# Patient Record
Sex: FEMALE | Race: WHITE | HISPANIC OR LATINO | Employment: UNEMPLOYED | URBAN - METROPOLITAN AREA
[De-identification: names, ages, dates, MRNs, and addresses within clinical notes are randomized per-mention and may not be internally consistent; named-entity substitution may affect disease eponyms.]

---

## 2017-01-24 ENCOUNTER — TELEPHONE (OUTPATIENT)
Dept: OBSTETRICS AND GYNECOLOGY | Age: 57
End: 2017-01-24

## 2017-01-24 NOTE — TELEPHONE ENCOUNTER
Pt called stating she is still having some issue after her hysterectomy in Aug '16. Pt states there are some problems w/ bladder and rectal prolapse as well. Pt states the pain is very minimal when it occurs, it is more discomfort.    Pt # [phone]

## 2017-02-07 ENCOUNTER — OFFICE VISIT (OUTPATIENT)
Dept: OBSTETRICS AND GYNECOLOGY | Age: 57
End: 2017-02-07

## 2017-02-07 VITALS
HEIGHT: 64 IN | DIASTOLIC BLOOD PRESSURE: 62 MMHG | WEIGHT: 180.4 LBS | BODY MASS INDEX: 30.8 KG/M2 | SYSTOLIC BLOOD PRESSURE: 124 MMHG

## 2017-02-07 DIAGNOSIS — N81.10 BLADDER PROLAPSE, FEMALE, ACQUIRED: Primary | ICD-10-CM

## 2017-02-07 PROCEDURE — 99213 OFFICE O/P EST LOW 20 MIN: CPT | Performed by: OBSTETRICS & GYNECOLOGY

## 2017-02-07 NOTE — PROGRESS NOTES
Wen Correa is a 56 y.o. female is being seen today for bladder prolapse since hyst. Worsening  Chief Complaint   Patient presents with   • Bladder Prolapse   .    History of Present Illness  Patient is here for checkup and she is only Georgina 56 months after from a hysterectomy and P repair she is complaining of vaginal pressure when she walks goes upstairs really most of the time.  She has no incontinence no frequency no urgency good bowel movements.  The following portions of the patient's history were reviewed and updated as appropriate: allergies, current medications, past family history, past medical history, past social history, past surgical history and problem list.    PAST MEDICAL HISTORY  Past Medical History   Diagnosis Date   • Prolapse of uterus      OB History     No data available        Past Surgical History   Procedure Laterality Date   • No past surgeries     • Laparoscopic assisted vaginal hysterectomy N/A 8/24/2016     Procedure: LAVH AND BSO;  Surgeon: Dilip Bond MD;  [place] [facility] [place] [facility] [place]:    • Anterior and posterior vaginal repair N/A 8/24/2016     Procedure: ANTERIOR AND POSTERIOR  REPAIR;  Surgeon: Dilip Bond MD;  [place] [facility] [place] [facility] [place]  Service:      History reviewed. No pertinent family history.  History   Smoking Status   • Never Smoker   Smokeless Tobacco   • Never Used       Current Outpatient Prescriptions:   •  fluticasone (CUTIVATE) 0.05 % cream, Apply  topically 2 (Two) Times a Day., Disp: 15 g, Rfl: 1  •  oxyCODONE-acetaminophen (PERCOCET) 7.5-325 MG per tablet, TAKE 1 TO 2 TABLETS EVERY 4 HOURS AS NEEDED FOR PAIN, Disp: , Rfl: 0    There is no immunization history on file for this patient.    Review of Systems    Objective   Physical Exam  On exam she had a to 3+ cystocele which is recurrent after surgery.  Small rectocele and a little bit of an enterocele vaginal prolapse.  This is all discussed with the patient today.    Assessment/Plan    Cary was seen today for bladder prolapse.    Diagnoses and all orders for this visit:    Bladder prolapse, female, acquired      I talked about the options for this which be to do nothing with a longer versus pessary versus surgery.  At her age being sexually active I would somewhat encourage surgery but I would refer her out for at least a second opinion if not a uro-gynecologist.  Patient will consider this but  I will put in a consult

## 2017-03-20 DIAGNOSIS — Z12.31 ENCOUNTER FOR SCREENING MAMMOGRAM FOR BREAST CANCER: Primary | ICD-10-CM

## 2017-03-21 ENCOUNTER — OFFICE VISIT (OUTPATIENT)
Dept: INTERNAL MEDICINE | Facility: CLINIC | Age: 57
End: 2017-03-21

## 2017-03-21 ENCOUNTER — APPOINTMENT (OUTPATIENT)
Dept: WOMENS IMAGING | Facility: HOSPITAL | Age: 57
End: 2017-03-21

## 2017-03-21 DIAGNOSIS — Z12.31 ENCOUNTER FOR SCREENING MAMMOGRAM FOR BREAST CANCER: ICD-10-CM

## 2017-03-21 PROCEDURE — 77067 SCR MAMMO BI INCL CAD: CPT | Performed by: INTERNAL MEDICINE

## 2017-03-21 PROCEDURE — 77067 SCR MAMMO BI INCL CAD: CPT | Performed by: RADIOLOGY

## 2017-04-17 ENCOUNTER — OFFICE VISIT (OUTPATIENT)
Dept: INTERNAL MEDICINE | Facility: CLINIC | Age: 57
End: 2017-04-17

## 2017-04-17 VITALS
SYSTOLIC BLOOD PRESSURE: 128 MMHG | BODY MASS INDEX: 31.07 KG/M2 | DIASTOLIC BLOOD PRESSURE: 78 MMHG | WEIGHT: 182 LBS | HEIGHT: 64 IN

## 2017-04-17 DIAGNOSIS — N64.4 PAIN OF BOTH BREASTS: Primary | ICD-10-CM

## 2017-04-17 PROCEDURE — 99213 OFFICE O/P EST LOW 20 MIN: CPT | Performed by: INTERNAL MEDICINE

## 2017-04-17 NOTE — PROGRESS NOTES
"Chief Complaint   Patient presents with   • Breast Pain        Subjective   Cary Correa is a 57 y.o. female     Breast Pain   This is a new problem. The current episode started more than 1 month ago. The problem occurs intermittently. The problem has been unchanged. Pertinent negatives include no anorexia, chest pain, coughing, diaphoresis, fatigue or fever.   : Breasts feel tender, left bothers her a little more than the right. No nipple discharge.  No lumps that she has noticed. No skin changes. She has recently had a mammogram.  No abnormality was seen.    The following portions of the patient's history were reviewed and updated as appropriate: allergies, current medications, past social history, problem list, past surgical history    Review of Systems   Constitutional: Negative for appetite change, diaphoresis, fatigue, fever and unexpected weight change.   Respiratory: Negative for cough and shortness of breath.    Cardiovascular: Negative for chest pain, palpitations and leg swelling.   Gastrointestinal: Negative for anorexia.       /78 (BP Location: Left arm, Patient Position: Sitting, Cuff Size: Adult)  Ht 64\" (162.6 cm)  Wt 182 lb (82.6 kg)  LMP  (LMP Unknown)  BMI 31.24 kg/m2     Objective   Physical Exam   Constitutional: She appears well-developed and well-nourished. No distress.   Cardiovascular: Regular rhythm, S1 normal and S2 normal.  Exam reveals no gallop and no friction rub.    No murmur heard.  Pulmonary/Chest: Effort normal and breath sounds normal. She has no wheezes. She has no rhonchi. She has no rales. Right breast exhibits no inverted nipple, no mass, no nipple discharge, no skin change and no tenderness. Left breast exhibits tenderness. Left breast exhibits no inverted nipple, no nipple discharge and no skin change.   Tenderness to palpation present left breast, upper outer quadrant.  There is some irregularity of the breast tissue in that area.   Lymphadenopathy:     She has " no cervical adenopathy.     She has no axillary adenopathy.   Nursing note and vitals reviewed.      Assessment/Plan   Problem List Items Addressed This Visit     None      Visit Diagnoses     Pain of both breasts    -  Primary      probable fibrocystic changes palpated upper outer quadrant left breast.  Discussed further evaluation with ultrasound.  She has a lot of medical bills right now and would like to hold off on that for now. Will re-evaluate in 3 months.

## 2017-05-08 ENCOUNTER — TELEPHONE (OUTPATIENT)
Dept: OBSTETRICS AND GYNECOLOGY | Age: 57
End: 2017-05-08

## 2017-05-08 DIAGNOSIS — N81.10 VAGINAL PROLAPSE WITHOUT UTERINE PROLAPSE: Primary | ICD-10-CM

## 2017-05-09 ENCOUNTER — TELEPHONE (OUTPATIENT)
Dept: OBSTETRICS AND GYNECOLOGY | Age: 57
End: 2017-05-09

## 2017-05-10 ENCOUNTER — TELEPHONE (OUTPATIENT)
Dept: OBSTETRICS AND GYNECOLOGY | Age: 57
End: 2017-05-10

## 2017-07-27 ENCOUNTER — APPOINTMENT (OUTPATIENT)
Dept: PREADMISSION TESTING | Facility: HOSPITAL | Age: 57
End: 2017-07-27

## 2017-07-27 VITALS
DIASTOLIC BLOOD PRESSURE: 85 MMHG | HEIGHT: 64 IN | OXYGEN SATURATION: 97 % | WEIGHT: 183 LBS | TEMPERATURE: 98.5 F | HEART RATE: 58 BPM | SYSTOLIC BLOOD PRESSURE: 133 MMHG | BODY MASS INDEX: 31.24 KG/M2 | RESPIRATION RATE: 16 BRPM

## 2017-07-27 LAB
ABO GROUP BLD: NORMAL
ALBUMIN SERPL-MCNC: 4.4 G/DL (ref 3.5–5.2)
ALBUMIN/GLOB SERPL: 1.3 G/DL
ALP SERPL-CCNC: 81 U/L (ref 39–117)
ALT SERPL W P-5'-P-CCNC: 23 U/L (ref 1–33)
ANION GAP SERPL CALCULATED.3IONS-SCNC: 11.4 MMOL/L
AST SERPL-CCNC: 21 U/L (ref 1–32)
BACTERIA UR QL AUTO: ABNORMAL /HPF
BILIRUB SERPL-MCNC: 0.4 MG/DL (ref 0.1–1.2)
BILIRUB UR QL STRIP: NEGATIVE
BLD GP AB SCN SERPL QL: POSITIVE
BUN BLD-MCNC: 14 MG/DL (ref 6–20)
BUN/CREAT SERPL: 15.2 (ref 7–25)
CALCIUM SPEC-SCNC: 9.7 MG/DL (ref 8.6–10.5)
CHLORIDE SERPL-SCNC: 103 MMOL/L (ref 98–107)
CLARITY UR: CLEAR
CO2 SERPL-SCNC: 26.6 MMOL/L (ref 22–29)
COLOR UR: YELLOW
CREAT BLD-MCNC: 0.92 MG/DL (ref 0.57–1)
DEPRECATED RDW RBC AUTO: 49.3 FL (ref 37–54)
ERYTHROCYTE [DISTWIDTH] IN BLOOD BY AUTOMATED COUNT: 14.5 % (ref 11.7–13)
GFR SERPL CREATININE-BSD FRML MDRD: 63 ML/MIN/1.73
GLOBULIN UR ELPH-MCNC: 3.4 GM/DL
GLUCOSE BLD-MCNC: 86 MG/DL (ref 65–99)
GLUCOSE UR STRIP-MCNC: NEGATIVE MG/DL
HCT VFR BLD AUTO: 43.7 % (ref 35.6–45.5)
HGB BLD-MCNC: 13.9 G/DL (ref 11.9–15.5)
HGB UR QL STRIP.AUTO: ABNORMAL
HYALINE CASTS UR QL AUTO: ABNORMAL /LPF
KETONES UR QL STRIP: NEGATIVE
LEUKOCYTE ESTERASE UR QL STRIP.AUTO: NEGATIVE
MCH RBC QN AUTO: 29.3 PG (ref 26.9–32)
MCHC RBC AUTO-ENTMCNC: 31.8 G/DL (ref 32.4–36.3)
MCV RBC AUTO: 92 FL (ref 80.5–98.2)
NITRITE UR QL STRIP: NEGATIVE
NONSPECIFIC GEL REACTION: NORMAL
PH UR STRIP.AUTO: 7 [PH] (ref 5–8)
PLATELET # BLD AUTO: 259 10*3/MM3 (ref 140–500)
PMV BLD AUTO: 11.1 FL (ref 6–12)
POTASSIUM BLD-SCNC: 3.5 MMOL/L (ref 3.5–5.2)
PROT SERPL-MCNC: 7.8 G/DL (ref 6–8.5)
PROT UR QL STRIP: NEGATIVE
RBC # BLD AUTO: 4.75 10*6/MM3 (ref 3.9–5.2)
RBC # UR: ABNORMAL /HPF
REF LAB TEST METHOD: ABNORMAL
RH BLD: POSITIVE
SODIUM BLD-SCNC: 141 MMOL/L (ref 136–145)
SP GR UR STRIP: 1.01 (ref 1–1.03)
SQUAMOUS #/AREA URNS HPF: ABNORMAL /HPF
UROBILINOGEN UR QL STRIP: ABNORMAL
WBC NRBC COR # BLD: 6.99 10*3/MM3 (ref 4.5–10.7)
WBC UR QL AUTO: ABNORMAL /HPF

## 2017-07-27 PROCEDURE — 86870 RBC ANTIBODY IDENTIFICATION: CPT | Performed by: OBSTETRICS & GYNECOLOGY

## 2017-07-27 PROCEDURE — 36415 COLL VENOUS BLD VENIPUNCTURE: CPT

## 2017-07-27 PROCEDURE — 85027 COMPLETE CBC AUTOMATED: CPT | Performed by: OBSTETRICS & GYNECOLOGY

## 2017-07-27 PROCEDURE — 86900 BLOOD TYPING SEROLOGIC ABO: CPT | Performed by: OBSTETRICS & GYNECOLOGY

## 2017-07-27 PROCEDURE — 86850 RBC ANTIBODY SCREEN: CPT | Performed by: OBSTETRICS & GYNECOLOGY

## 2017-07-27 PROCEDURE — 86920 COMPATIBILITY TEST SPIN: CPT

## 2017-07-27 PROCEDURE — 80053 COMPREHEN METABOLIC PANEL: CPT | Performed by: OBSTETRICS & GYNECOLOGY

## 2017-07-27 PROCEDURE — 86922 COMPATIBILITY TEST ANTIGLOB: CPT

## 2017-07-27 PROCEDURE — 81003 URINALYSIS AUTO W/O SCOPE: CPT | Performed by: OBSTETRICS & GYNECOLOGY

## 2017-07-27 PROCEDURE — 86901 BLOOD TYPING SEROLOGIC RH(D): CPT | Performed by: OBSTETRICS & GYNECOLOGY

## 2017-07-27 PROCEDURE — 81001 URINALYSIS AUTO W/SCOPE: CPT | Performed by: OBSTETRICS & GYNECOLOGY

## 2017-07-27 RX ORDER — ESTRADIOL 1 MG/1
1 TABLET ORAL DAILY
COMMUNITY
End: 2017-09-28 | Stop reason: SDUPTHER

## 2017-07-27 RX ORDER — ACETAMINOPHEN 325 MG/1
650 TABLET ORAL EVERY 4 HOURS PRN
COMMUNITY

## 2017-08-10 ENCOUNTER — ANESTHESIA (OUTPATIENT)
Dept: PERIOP | Facility: HOSPITAL | Age: 57
End: 2017-08-10

## 2017-08-10 ENCOUNTER — HOSPITAL ENCOUNTER (OUTPATIENT)
Facility: HOSPITAL | Age: 57
Setting detail: OBSERVATION
Discharge: HOME | End: 2017-08-11
Attending: OBSTETRICS & GYNECOLOGY | Admitting: OBSTETRICS & GYNECOLOGY

## 2017-08-10 ENCOUNTER — ANESTHESIA EVENT (OUTPATIENT)
Dept: PERIOP | Facility: HOSPITAL | Age: 57
End: 2017-08-10

## 2017-08-10 DIAGNOSIS — L98.9 SKIN LESION: Primary | ICD-10-CM

## 2017-08-10 DIAGNOSIS — R26.89 IMPAIRED GAIT AND MOBILITY: ICD-10-CM

## 2017-08-10 PROBLEM — N99.3 PROLAPSE OF VAGINAL VAULT AFTER HYSTERECTOMY: Status: ACTIVE | Noted: 2017-08-10

## 2017-08-10 PROBLEM — N81.5 VAGINAL ENTEROCELE, CONGENITAL OR ACQUIRED: Status: ACTIVE | Noted: 2017-08-10

## 2017-08-10 PROBLEM — N89.8 VAGINAL SCAR: Status: ACTIVE | Noted: 2017-08-10

## 2017-08-10 PROBLEM — N81.6 RECTOCELE: Status: ACTIVE | Noted: 2017-08-10

## 2017-08-10 PROBLEM — N81.89 LOSS OF PERINEAL BODY, FEMALE: Status: ACTIVE | Noted: 2017-08-10

## 2017-08-10 PROBLEM — R15.0 INCOMPLETE DEFECATION: Status: ACTIVE | Noted: 2017-08-10

## 2017-08-10 PROBLEM — N81.11 CYSTOCELE, MIDLINE: Status: ACTIVE | Noted: 2017-08-10

## 2017-08-10 PROBLEM — N90.89 VULVAR LESION: Status: ACTIVE | Noted: 2017-08-10

## 2017-08-10 PROBLEM — N81.9 VAGINAL VAULT PROLAPSE: Status: ACTIVE | Noted: 2017-08-10

## 2017-08-10 PROCEDURE — G0378 HOSPITAL OBSERVATION PER HR: HCPCS

## 2017-08-10 PROCEDURE — 25010000002 FENTANYL CITRATE (PF) 100 MCG/2ML SOLUTION: Performed by: NURSE ANESTHETIST, CERTIFIED REGISTERED

## 2017-08-10 PROCEDURE — 25010000002 DEXAMETHASONE PER 1 MG: Performed by: NURSE ANESTHETIST, CERTIFIED REGISTERED

## 2017-08-10 PROCEDURE — 25010000002 PROPOFOL 10 MG/ML EMULSION: Performed by: NURSE ANESTHETIST, CERTIFIED REGISTERED

## 2017-08-10 PROCEDURE — 25010000002 LEVOFLOXACIN PER 250 MG: Performed by: OBSTETRICS & GYNECOLOGY

## 2017-08-10 PROCEDURE — 25010000002 MORPHINE PER 10 MG: Performed by: OBSTETRICS & GYNECOLOGY

## 2017-08-10 PROCEDURE — 25010000002 ONDANSETRON PER 1 MG: Performed by: NURSE ANESTHETIST, CERTIFIED REGISTERED

## 2017-08-10 PROCEDURE — 25010000002 MIDAZOLAM PER 1 MG: Performed by: ANESTHESIOLOGY

## 2017-08-10 PROCEDURE — 25010000002 HYDROMORPHONE PER 4 MG: Performed by: NURSE ANESTHETIST, CERTIFIED REGISTERED

## 2017-08-10 PROCEDURE — 63710000001 PROMETHAZINE PER 12.5 MG: Performed by: OBSTETRICS & GYNECOLOGY

## 2017-08-10 PROCEDURE — 25010000002 PROMETHAZINE PER 50 MG: Performed by: NURSE ANESTHETIST, CERTIFIED REGISTERED

## 2017-08-10 PROCEDURE — 88305 TISSUE EXAM BY PATHOLOGIST: CPT | Performed by: OBSTETRICS & GYNECOLOGY

## 2017-08-10 PROCEDURE — 25010000002 NEOSTIGMINE PER 0.5 MG: Performed by: NURSE ANESTHETIST, CERTIFIED REGISTERED

## 2017-08-10 PROCEDURE — 94799 UNLISTED PULMONARY SVC/PX: CPT

## 2017-08-10 DEVICE — GRFT TISS STRATTICE FIRM 6X10CM: Type: IMPLANTABLE DEVICE | Status: FUNCTIONAL

## 2017-08-10 RX ORDER — PROPOFOL 10 MG/ML
VIAL (ML) INTRAVENOUS AS NEEDED
Status: DISCONTINUED | OUTPATIENT
Start: 2017-08-10 | End: 2017-08-10 | Stop reason: SURG

## 2017-08-10 RX ORDER — FAMOTIDINE 10 MG/ML
20 INJECTION, SOLUTION INTRAVENOUS ONCE
Status: COMPLETED | OUTPATIENT
Start: 2017-08-10 | End: 2017-08-10

## 2017-08-10 RX ORDER — MIDAZOLAM HYDROCHLORIDE 1 MG/ML
1 INJECTION INTRAMUSCULAR; INTRAVENOUS
Status: DISCONTINUED | OUTPATIENT
Start: 2017-08-10 | End: 2017-08-10 | Stop reason: HOSPADM

## 2017-08-10 RX ORDER — LIDOCAINE HYDROCHLORIDE 20 MG/ML
INJECTION, SOLUTION INFILTRATION; PERINEURAL AS NEEDED
Status: DISCONTINUED | OUTPATIENT
Start: 2017-08-10 | End: 2017-08-10 | Stop reason: SURG

## 2017-08-10 RX ORDER — DEXAMETHASONE SODIUM PHOSPHATE 10 MG/ML
INJECTION INTRAMUSCULAR; INTRAVENOUS AS NEEDED
Status: DISCONTINUED | OUTPATIENT
Start: 2017-08-10 | End: 2017-08-10 | Stop reason: SURG

## 2017-08-10 RX ORDER — FENTANYL CITRATE 50 UG/ML
INJECTION, SOLUTION INTRAMUSCULAR; INTRAVENOUS AS NEEDED
Status: DISCONTINUED | OUTPATIENT
Start: 2017-08-10 | End: 2017-08-10 | Stop reason: SURG

## 2017-08-10 RX ORDER — CLINDAMYCIN PHOSPHATE 600 MG/50ML
600 INJECTION INTRAVENOUS EVERY 8 HOURS
Status: DISCONTINUED | OUTPATIENT
Start: 2017-08-10 | End: 2017-08-10 | Stop reason: HOSPADM

## 2017-08-10 RX ORDER — ESTRADIOL 1 MG/1
1 TABLET ORAL DAILY
Status: DISCONTINUED | OUTPATIENT
Start: 2017-08-11 | End: 2017-08-11 | Stop reason: HOSPADM

## 2017-08-10 RX ORDER — PROMETHAZINE HYDROCHLORIDE 25 MG/1
25 SUPPOSITORY RECTAL ONCE AS NEEDED
Status: COMPLETED | OUTPATIENT
Start: 2017-08-10 | End: 2017-08-10

## 2017-08-10 RX ORDER — DOCUSATE SODIUM 100 MG/1
100 CAPSULE, LIQUID FILLED ORAL 2 TIMES DAILY
Status: DISCONTINUED | OUTPATIENT
Start: 2017-08-10 | End: 2017-08-11 | Stop reason: HOSPADM

## 2017-08-10 RX ORDER — MIDAZOLAM HYDROCHLORIDE 1 MG/ML
2 INJECTION INTRAMUSCULAR; INTRAVENOUS
Status: DISCONTINUED | OUTPATIENT
Start: 2017-08-10 | End: 2017-08-10 | Stop reason: HOSPADM

## 2017-08-10 RX ORDER — LEVOFLOXACIN 5 MG/ML
750 INJECTION, SOLUTION INTRAVENOUS EVERY 24 HOURS
Status: DISCONTINUED | OUTPATIENT
Start: 2017-08-10 | End: 2017-08-10 | Stop reason: HOSPADM

## 2017-08-10 RX ORDER — SODIUM CHLORIDE 0.9 % (FLUSH) 0.9 %
1-10 SYRINGE (ML) INJECTION AS NEEDED
Status: DISCONTINUED | OUTPATIENT
Start: 2017-08-10 | End: 2017-08-10 | Stop reason: HOSPADM

## 2017-08-10 RX ORDER — HYDROMORPHONE HYDROCHLORIDE 1 MG/ML
0.5 INJECTION, SOLUTION INTRAMUSCULAR; INTRAVENOUS; SUBCUTANEOUS
Status: DISCONTINUED | OUTPATIENT
Start: 2017-08-10 | End: 2017-08-10 | Stop reason: HOSPADM

## 2017-08-10 RX ORDER — OXYCODONE HYDROCHLORIDE AND ACETAMINOPHEN 5; 325 MG/1; MG/1
2 TABLET ORAL EVERY 4 HOURS PRN
Status: DISCONTINUED | OUTPATIENT
Start: 2017-08-10 | End: 2017-08-11 | Stop reason: HOSPADM

## 2017-08-10 RX ORDER — ONDANSETRON 4 MG/1
4 TABLET, ORALLY DISINTEGRATING ORAL EVERY 6 HOURS PRN
Status: DISCONTINUED | OUTPATIENT
Start: 2017-08-10 | End: 2017-08-11 | Stop reason: HOSPADM

## 2017-08-10 RX ORDER — NITROFURANTOIN 25; 75 MG/1; MG/1
100 CAPSULE ORAL EVERY 12 HOURS SCHEDULED
Status: DISCONTINUED | OUTPATIENT
Start: 2017-08-10 | End: 2017-08-11 | Stop reason: HOSPADM

## 2017-08-10 RX ORDER — SODIUM CHLORIDE, SODIUM LACTATE, POTASSIUM CHLORIDE, CALCIUM CHLORIDE 600; 310; 30; 20 MG/100ML; MG/100ML; MG/100ML; MG/100ML
9 INJECTION, SOLUTION INTRAVENOUS CONTINUOUS
Status: DISCONTINUED | OUTPATIENT
Start: 2017-08-10 | End: 2017-08-11 | Stop reason: HOSPADM

## 2017-08-10 RX ORDER — OXYCODONE HYDROCHLORIDE AND ACETAMINOPHEN 5; 325 MG/1; MG/1
2 TABLET ORAL EVERY 4 HOURS PRN
Status: DISCONTINUED | OUTPATIENT
Start: 2017-08-10 | End: 2017-08-10 | Stop reason: SDUPTHER

## 2017-08-10 RX ORDER — ESTRADIOL 0.1 MG/G
CREAM VAGINAL AS NEEDED
Status: DISCONTINUED | OUTPATIENT
Start: 2017-08-10 | End: 2017-08-10 | Stop reason: HOSPADM

## 2017-08-10 RX ORDER — DIPHENHYDRAMINE HYDROCHLORIDE 50 MG/ML
12.5 INJECTION INTRAMUSCULAR; INTRAVENOUS
Status: DISCONTINUED | OUTPATIENT
Start: 2017-08-10 | End: 2017-08-10 | Stop reason: HOSPADM

## 2017-08-10 RX ORDER — ONDANSETRON 2 MG/ML
INJECTION INTRAMUSCULAR; INTRAVENOUS AS NEEDED
Status: DISCONTINUED | OUTPATIENT
Start: 2017-08-10 | End: 2017-08-10 | Stop reason: SURG

## 2017-08-10 RX ORDER — BUPIVACAINE HYDROCHLORIDE AND EPINEPHRINE 2.5; 5 MG/ML; UG/ML
INJECTION, SOLUTION INFILTRATION; PERINEURAL AS NEEDED
Status: DISCONTINUED | OUTPATIENT
Start: 2017-08-10 | End: 2017-08-10 | Stop reason: HOSPADM

## 2017-08-10 RX ORDER — LABETALOL HYDROCHLORIDE 5 MG/ML
5 INJECTION, SOLUTION INTRAVENOUS
Status: DISCONTINUED | OUTPATIENT
Start: 2017-08-10 | End: 2017-08-10 | Stop reason: HOSPADM

## 2017-08-10 RX ORDER — HYDROMORPHONE HCL 110MG/55ML
PATIENT CONTROLLED ANALGESIA SYRINGE INTRAVENOUS AS NEEDED
Status: DISCONTINUED | OUTPATIENT
Start: 2017-08-10 | End: 2017-08-10 | Stop reason: SURG

## 2017-08-10 RX ORDER — PROMETHAZINE HYDROCHLORIDE 12.5 MG/1
12.5 TABLET ORAL 2 TIMES DAILY
Status: DISCONTINUED | OUTPATIENT
Start: 2017-08-10 | End: 2017-08-11 | Stop reason: HOSPADM

## 2017-08-10 RX ORDER — FENTANYL CITRATE 50 UG/ML
50 INJECTION, SOLUTION INTRAMUSCULAR; INTRAVENOUS
Status: DISCONTINUED | OUTPATIENT
Start: 2017-08-10 | End: 2017-08-10 | Stop reason: HOSPADM

## 2017-08-10 RX ORDER — PROMETHAZINE HYDROCHLORIDE 25 MG/ML
6.25 INJECTION, SOLUTION INTRAMUSCULAR; INTRAVENOUS ONCE AS NEEDED
Status: COMPLETED | OUTPATIENT
Start: 2017-08-10 | End: 2017-08-10

## 2017-08-10 RX ORDER — GLYCOPYRROLATE 0.2 MG/ML
INJECTION INTRAMUSCULAR; INTRAVENOUS AS NEEDED
Status: DISCONTINUED | OUTPATIENT
Start: 2017-08-10 | End: 2017-08-10 | Stop reason: SURG

## 2017-08-10 RX ORDER — ONDANSETRON 4 MG/1
4 TABLET, FILM COATED ORAL EVERY 6 HOURS PRN
Status: DISCONTINUED | OUTPATIENT
Start: 2017-08-10 | End: 2017-08-11 | Stop reason: HOSPADM

## 2017-08-10 RX ORDER — OXYCODONE HYDROCHLORIDE AND ACETAMINOPHEN 5; 325 MG/1; MG/1
1 TABLET ORAL EVERY 4 HOURS PRN
Status: DISCONTINUED | OUTPATIENT
Start: 2017-08-10 | End: 2017-08-11 | Stop reason: HOSPADM

## 2017-08-10 RX ORDER — IPRATROPIUM BROMIDE AND ALBUTEROL SULFATE 2.5; .5 MG/3ML; MG/3ML
3 SOLUTION RESPIRATORY (INHALATION) ONCE AS NEEDED
Status: DISCONTINUED | OUTPATIENT
Start: 2017-08-10 | End: 2017-08-10 | Stop reason: HOSPADM

## 2017-08-10 RX ORDER — NALOXONE HCL 0.4 MG/ML
0.4 VIAL (ML) INJECTION
Status: DISCONTINUED | OUTPATIENT
Start: 2017-08-10 | End: 2017-08-11 | Stop reason: HOSPADM

## 2017-08-10 RX ORDER — PHENAZOPYRIDINE HYDROCHLORIDE 200 MG/1
200 TABLET, FILM COATED ORAL ONCE
Status: COMPLETED | OUTPATIENT
Start: 2017-08-10 | End: 2017-08-10

## 2017-08-10 RX ORDER — ROCURONIUM BROMIDE 10 MG/ML
INJECTION, SOLUTION INTRAVENOUS AS NEEDED
Status: DISCONTINUED | OUTPATIENT
Start: 2017-08-10 | End: 2017-08-10 | Stop reason: SURG

## 2017-08-10 RX ORDER — MORPHINE SULFATE 2 MG/ML
2 INJECTION, SOLUTION INTRAMUSCULAR; INTRAVENOUS
Status: DISCONTINUED | OUTPATIENT
Start: 2017-08-10 | End: 2017-08-11 | Stop reason: HOSPADM

## 2017-08-10 RX ORDER — PROMETHAZINE HYDROCHLORIDE 12.5 MG/1
12.5 SUPPOSITORY RECTAL EVERY 4 HOURS PRN
Status: DISCONTINUED | OUTPATIENT
Start: 2017-08-10 | End: 2017-08-11 | Stop reason: HOSPADM

## 2017-08-10 RX ORDER — ONDANSETRON 2 MG/ML
4 INJECTION INTRAMUSCULAR; INTRAVENOUS EVERY 6 HOURS PRN
Status: DISCONTINUED | OUTPATIENT
Start: 2017-08-10 | End: 2017-08-11 | Stop reason: HOSPADM

## 2017-08-10 RX ORDER — PROMETHAZINE HYDROCHLORIDE 25 MG/1
25 TABLET ORAL ONCE AS NEEDED
Status: COMPLETED | OUTPATIENT
Start: 2017-08-10 | End: 2017-08-10

## 2017-08-10 RX ORDER — DEXTROSE AND SODIUM CHLORIDE 5; .45 G/100ML; G/100ML
100 INJECTION, SOLUTION INTRAVENOUS CONTINUOUS
Status: DISCONTINUED | OUTPATIENT
Start: 2017-08-10 | End: 2017-08-11 | Stop reason: HOSPADM

## 2017-08-10 RX ORDER — HYDRALAZINE HYDROCHLORIDE 20 MG/ML
5 INJECTION INTRAMUSCULAR; INTRAVENOUS
Status: DISCONTINUED | OUTPATIENT
Start: 2017-08-10 | End: 2017-08-10 | Stop reason: HOSPADM

## 2017-08-10 RX ORDER — MAGNESIUM HYDROXIDE 1200 MG/15ML
LIQUID ORAL AS NEEDED
Status: DISCONTINUED | OUTPATIENT
Start: 2017-08-10 | End: 2017-08-10 | Stop reason: HOSPADM

## 2017-08-10 RX ADMIN — NEOSTIGMINE METHYLSULFATE 2 MG: 1 INJECTION INTRAMUSCULAR; INTRAVENOUS; SUBCUTANEOUS at 17:37

## 2017-08-10 RX ADMIN — PROMETHAZINE HYDROCHLORIDE 6.25 MG: 25 INJECTION INTRAMUSCULAR; INTRAVENOUS at 18:56

## 2017-08-10 RX ADMIN — MORPHINE SULFATE 2 MG: 2 INJECTION, SOLUTION INTRAMUSCULAR; INTRAVENOUS at 22:06

## 2017-08-10 RX ADMIN — ROCURONIUM BROMIDE 10 MG: 10 INJECTION INTRAVENOUS at 14:55

## 2017-08-10 RX ADMIN — DEXAMETHASONE SODIUM PHOSPHATE 6 MG: 10 INJECTION INTRAMUSCULAR; INTRAVENOUS at 13:00

## 2017-08-10 RX ADMIN — FENTANYL CITRATE 100 MCG: 50 INJECTION, SOLUTION INTRAMUSCULAR; INTRAVENOUS at 12:48

## 2017-08-10 RX ADMIN — ROCURONIUM BROMIDE 50 MG: 10 INJECTION INTRAVENOUS at 12:48

## 2017-08-10 RX ADMIN — FENTANYL CITRATE 50 MCG: 50 INJECTION, SOLUTION INTRAMUSCULAR; INTRAVENOUS at 13:15

## 2017-08-10 RX ADMIN — HYDROMORPHONE HYDROCHLORIDE 0.5 MG: 2 INJECTION, SOLUTION INTRAMUSCULAR; INTRAVENOUS; SUBCUTANEOUS at 14:55

## 2017-08-10 RX ADMIN — NITROFURANTOIN MONOHYDRATE/MACROCRYSTALLINE 100 MG: 25; 75 CAPSULE ORAL at 23:46

## 2017-08-10 RX ADMIN — ROCURONIUM BROMIDE 20 MG: 10 INJECTION INTRAVENOUS at 14:02

## 2017-08-10 RX ADMIN — MIDAZOLAM 1 MG: 1 INJECTION INTRAMUSCULAR; INTRAVENOUS at 11:23

## 2017-08-10 RX ADMIN — HYDROMORPHONE HYDROCHLORIDE 0.5 MG: 2 INJECTION, SOLUTION INTRAMUSCULAR; INTRAVENOUS; SUBCUTANEOUS at 15:32

## 2017-08-10 RX ADMIN — HYDROMORPHONE HYDROCHLORIDE 0.5 MG: 2 INJECTION, SOLUTION INTRAMUSCULAR; INTRAVENOUS; SUBCUTANEOUS at 17:00

## 2017-08-10 RX ADMIN — FENTANYL CITRATE 50 MCG: 50 INJECTION INTRAMUSCULAR; INTRAVENOUS at 18:50

## 2017-08-10 RX ADMIN — SODIUM CHLORIDE, POTASSIUM CHLORIDE, SODIUM LACTATE AND CALCIUM CHLORIDE 9 ML/HR: 600; 310; 30; 20 INJECTION, SOLUTION INTRAVENOUS at 11:15

## 2017-08-10 RX ADMIN — FAMOTIDINE 20 MG: 10 INJECTION, SOLUTION INTRAVENOUS at 11:23

## 2017-08-10 RX ADMIN — PROMETHAZINE HYDROCHLORIDE 12.5 MG: 12.5 TABLET ORAL at 23:54

## 2017-08-10 RX ADMIN — PHENAZOPYRIDINE HYDROCHLORIDE 200 MG: 200 TABLET, FILM COATED ORAL at 11:08

## 2017-08-10 RX ADMIN — HYDROMORPHONE HYDROCHLORIDE 0.5 MG: 1 INJECTION, SOLUTION INTRAMUSCULAR; INTRAVENOUS; SUBCUTANEOUS at 19:34

## 2017-08-10 RX ADMIN — CLINDAMYCIN PHOSPHATE 600 MG: 12 INJECTION, SOLUTION INTRAVENOUS at 13:00

## 2017-08-10 RX ADMIN — ONDANSETRON 4 MG: 2 INJECTION INTRAMUSCULAR; INTRAVENOUS at 17:37

## 2017-08-10 RX ADMIN — SODIUM CHLORIDE, POTASSIUM CHLORIDE, SODIUM LACTATE AND CALCIUM CHLORIDE: 600; 310; 30; 20 INJECTION, SOLUTION INTRAVENOUS at 13:45

## 2017-08-10 RX ADMIN — PROPOFOL 50 MG: 10 INJECTION, EMULSION INTRAVENOUS at 14:02

## 2017-08-10 RX ADMIN — SODIUM CHLORIDE, POTASSIUM CHLORIDE, SODIUM LACTATE AND CALCIUM CHLORIDE: 600; 310; 30; 20 INJECTION, SOLUTION INTRAVENOUS at 16:15

## 2017-08-10 RX ADMIN — HYDROMORPHONE HYDROCHLORIDE 0.5 MG: 2 INJECTION, SOLUTION INTRAMUSCULAR; INTRAVENOUS; SUBCUTANEOUS at 18:00

## 2017-08-10 RX ADMIN — PROPOFOL 150 MG: 10 INJECTION, EMULSION INTRAVENOUS at 12:48

## 2017-08-10 RX ADMIN — ROCURONIUM BROMIDE 10 MG: 10 INJECTION INTRAVENOUS at 15:32

## 2017-08-10 RX ADMIN — GLYCOPYRROLATE 0.4 MG: 0.2 INJECTION INTRAMUSCULAR; INTRAVENOUS at 17:37

## 2017-08-10 RX ADMIN — ROCURONIUM BROMIDE 10 MG: 10 INJECTION INTRAVENOUS at 13:33

## 2017-08-10 RX ADMIN — FENTANYL CITRATE 100 MCG: 50 INJECTION, SOLUTION INTRAMUSCULAR; INTRAVENOUS at 14:02

## 2017-08-10 RX ADMIN — PROPOFOL 50 MG: 10 INJECTION, EMULSION INTRAVENOUS at 12:50

## 2017-08-10 RX ADMIN — DEXTROSE AND SODIUM CHLORIDE 100 ML/HR: 5; 450 INJECTION, SOLUTION INTRAVENOUS at 22:12

## 2017-08-10 RX ADMIN — LEVOFLOXACIN 750 MG: 5 INJECTION, SOLUTION INTRAVENOUS at 11:15

## 2017-08-10 RX ADMIN — LIDOCAINE HYDROCHLORIDE 60 MG: 20 INJECTION, SOLUTION INFILTRATION; PERINEURAL at 12:48

## 2017-08-10 NOTE — H&P
Female Pelvic Medicine and Reconstructive [facility]   History & Physical    Patient Identification:  Name: Cary Correa Age: 57 y.o. Sex: female :  1960 MRN: Female Pelvic Medicine and Reconstructive Surgery   History & Physical    Patient Identification:  Name: Cary Correa Age: 57 y.o. Sex: female :  1960 MRN: [id number]                            Problem List:  Active Problems:    * No active hospital problems. *    Past Medical History:  Past Medical History:   Diagnosis Date   • Cystocele    • Enterocele    • Prolapse of uterus    • Rectocele    • Skin lesion     MONS PUBIS     Past Surgical History:  Past Surgical History:   Procedure Laterality Date   • ANTERIOR AND POSTERIOR VAGINAL REPAIR N/A 2016    Procedure: ANTERIOR AND POSTERIOR  REPAIR;  Surgeon: Dilip Bond MD;  [place] [facility] [place] [facility] [place]  Service:    • LAPAROSCOPIC ASSISTED VAGINAL HYSTERECTOMY N/A 2016    Procedure: LAVH AND BSO;  Surgeon: Dilip Bond MD;  [place] [facility] [place] [facility] [place]  Service:       Home Meds:  No prescriptions prior to admission.     Current Meds:   No current facility-administered medications for this encounter.     Current Outpatient Prescriptions:   •  acetaminophen (TYLENOL) 325 MG tablet, Take 650 mg by mouth., Disp: , Rfl:   •  estradiol (ESTRACE) 1 MG tablet, Take 1 mg by mouth Daily., Disp: , Rfl:   •  fluticasone (CUTIVATE) 0.05 % cream, Apply  topically 2 (Two) Times a Day., Disp: 15 g, Rfl: 1  Allergies:  Allergies   Allergen Reactions   • Lactose Intolerance (Gi)    • Penicillins Hives     REDNESS     Immunizations:    There is no immunization history on file for this patient.  Social History:   Social History   Substance Use Topics   • Smoking status: Never Smoker   • Smokeless tobacco: Never Used   • Alcohol use No      Family History:  Family History   Problem Relation Age of Onset   • Malig Hyperthermia Neg Hx         Review of Systems  Constitutional:  Denies fever or chills    Eyes:  Denies change in visual acuity   HEENT:  Denies nasal congestion or sore throat   Respiratory:  Denies cough or shortness of breath   Cardiovascular:  Denies chest pain or edema   GI:  Denies abdominal pain, nausea, vomiting, bloody stools or diarrhea   :  Denies dysuria   Musculoskeletal:  Denies back pain or joint pain   Integument:  Denies rash   Neurologic:  Denies headache, focal weakness or sensory changes   Endocrine:  Denies polyuria or polydipsia   Lymphatic:  Denies swollen glands   Psychiatric:  Denies depression or anxiety       Objective:  tMax 24 hrs: No data recorded.    Vitals Ranges:        Exam:  Vital Signs: LMP  (LMP Unknown)  Constitutional:  Well developed, well nourished, no acute distress, non-toxic appearance    GI:  Soft, nondistended, normal bowel sounds, nontender, no organomegaly, no mass, no rebound, no guarding   :  No costovertebral angle tenderness   Musculoskeletal:  No edema, no tenderness, no deformities. Back- no tenderness  Integument:  Well hydrated, no rash   Lymphatic:  No lymphadenopathy noted   Neurologic:  Alert & oriented x 3,  Pelvic:     POPQ  0  0  -6  5  2  9  0  0  NA      Assessment:  See problem list as not incorporated into h and p by computer    Plan:    Laparoscopic uterosacral ligament colpopexy  Enterocele repair  Extraperitoneal colpopexy sacrospinous ligament fixation  Anterior colporrhaphy  Posterior colporrhaphy  Vaginal scar revision excision   reconstruction of perineal body  Removal of lesion of mons/vulva  Cystourethroscopy  And any other indicated procedures  Sonia Alvarez MD  8/10/2017

## 2017-08-10 NOTE — ANESTHESIA PROCEDURE NOTES
Airway  Urgency: elective    Date/Time: 8/10/2017 12:50 PM  Airway not difficult    General Information and Staff    Patient location during procedure: OR  Anesthesiologist: LAURI CARDOSO  CRNA: DONNA PAN    Indications and Patient Condition  Indications for airway management: airway protection    Preoxygenated: yes  MILS maintained throughout  Mask difficulty assessment: 2 - vent by mask + OA or adjuvant +/- NMBA    Final Airway Details  Final airway type: endotracheal airway      Successful airway: ETT  Cuffed: yes   Successful intubation technique: direct laryngoscopy  Endotracheal tube insertion site: oral  Blade: Giovanny  Blade size: #3  ETT size: 7.0 mm  Cormack-Lehane Classification: grade IIa - partial view of glottis  Placement verified by: chest auscultation and capnometry   Measured from: teeth  ETT to teeth (cm): 22  Number of attempts at approach: 1

## 2017-08-10 NOTE — ANESTHESIA POSTPROCEDURE EVALUATION
"Patient: Cary Correa    Procedure Summary     Date Anesthesia Start Anesthesia Stop Room / Location    08/10/17 1245 1805  [facility] / [facility]       Procedure Diagnosis Surgeon Provider    LAPAROSCOPIC UTEROSACRAL LIGMENT SUSPENSION,  (N/A Abdomen); SACRAL COLPOPEXY, SACROSPINOUS LIGAMENT FIXATION, EXTRAPERITONEAL COLPOPEXY, (N/A Abdomen);  ANTERIOR & POSTERIOR COLPORRHAPHY, ABDOMINAL ENTERCELE REPAIR, CYSTOURETHROSCOPY, RELEASE VAGINAL SCAR BAND, RECONSTRUCT PERINEAL BODY, REMOVE LESION ON MONSPUBIS (N/A Vagina) No diagnosis on file. MD Arnel Zee MD          Anesthesia Type: general  Last vitals  BP   117/69 (08/10/17 1915)    Temp        Pulse   92 (08/10/17 1915)   Resp   12 (08/10/17 1915)    SpO2   98 % (08/10/17 1915)      Post Anesthesia Care and Evaluation    Patient location during evaluation: PACU  Patient participation: complete - patient participated  Level of consciousness: awake and alert  Pain score: 0  Pain management: adequate  Airway patency: patent  Anesthetic complications: No anesthetic complications    Cardiovascular status: acceptable  Respiratory status: acceptable  Hydration status: acceptable    Comments: /69  Pulse 92  Temp 36.8 °C (98.2 °F) (Oral)   Resp 12  Ht 64\" (162.6 cm)  Wt 182 lb 6.4 oz (82.7 kg)  LMP  (LMP Unknown)  SpO2 98%  BMI 31.31 kg/m2      "

## 2017-08-10 NOTE — PLAN OF CARE
Problem: Patient Care Overview (Adult)  Goal: Plan of Care Review  Outcome: Ongoing (interventions implemented as appropriate)    08/10/17 1103   Coping/Psychosocial Response Interventions   Plan Of Care Reviewed With patient   Patient Care Overview   Progress no change       Goal: Adult Individualization and Mutuality  Outcome: Ongoing (interventions implemented as appropriate)    08/10/17 1103   Individualization   Patient Specific Preferences call Cary   Patient Specific Goals fix my female area so not problems with urination   Patient Specific Interventions teach S&S of infection   Mutuality/Individual Preferences   What Anxieties, Fears or Concerns Do You Have About Your Health or Care? none   What Questions Do You Have About Your Health or Care? none   What Information Would Help Us Give You More Personalized Care? none       Goal: Discharge Needs Assessment  Outcome: Ongoing (interventions implemented as appropriate)    08/10/17 1103   Discharge Needs Assessment   Concerns To Be Addressed denies needs/concerns at this time   Readmission Within The Last 30 Days no previous admission in last 30 days   Equipment Needed After Discharge none   Current Health   Anticipated Changes Related to Illness none         Problem: Perioperative Period (Adult)  Goal: Signs and Symptoms of Listed Potential Problems Will be Absent or Manageable (Perioperative Period)  Outcome: Ongoing (interventions implemented as appropriate)    08/10/17 1103   Perioperative Period   Problems Assessed (Perioperative Period) pain;hypoxia/hypoxemia;situational response   Problems Present (Perioperative Period) none

## 2017-08-10 NOTE — ANESTHESIA PREPROCEDURE EVALUATION
Anesthesia Evaluation     Patient summary reviewed and Nursing notes reviewed   NPO Solid Status: > 8 hours  NPO Liquid Status: > 2 hours     Airway   Mallampati: II  TM distance: >3 FB  Neck ROM: full  Dental - normal exam     Pulmonary - negative pulmonary ROS and normal exam   Cardiovascular - negative cardio ROS and normal exam        Neuro/Psych- negative ROS  GI/Hepatic/Renal/Endo    (+) obesity,      Musculoskeletal (-) negative ROS    Abdominal  - normal exam    Bowel sounds: normal.   Substance History - negative use     OB/GYN negative ob/gyn ROS         Other - negative ROS                                       Anesthesia Plan    ASA 2     general     Anesthetic plan and risks discussed with patient.

## 2017-08-10 NOTE — PLAN OF CARE
Laparoscopic uterosacral ligament colpopexy  Enterocele repair  Extraperitoneal colpopexy sacrospinous ligament fixation  Anterior colporrhaphy  Posterior colporrhaphy  Vaginal scar revision excision   reconstruction of perineal body  Removal of lesion of mons/vulva  Cystourethroscopy  And any other indicated procedures

## 2017-08-11 VITALS
DIASTOLIC BLOOD PRESSURE: 61 MMHG | HEART RATE: 73 BPM | OXYGEN SATURATION: 96 % | BODY MASS INDEX: 31.14 KG/M2 | SYSTOLIC BLOOD PRESSURE: 102 MMHG | TEMPERATURE: 99.3 F | WEIGHT: 182.4 LBS | RESPIRATION RATE: 16 BRPM | HEIGHT: 64 IN

## 2017-08-11 LAB
ABO + RH BLD: NORMAL
ABO + RH BLD: NORMAL
BH BB BLOOD EXPIRATION DATE: NORMAL
BH BB BLOOD EXPIRATION DATE: NORMAL
BH BB BLOOD TYPE BARCODE: 5100
BH BB BLOOD TYPE BARCODE: 5100
BH BB DISPENSE STATUS: NORMAL
BH BB DISPENSE STATUS: NORMAL
BH BB PRODUCT CODE: NORMAL
BH BB PRODUCT CODE: NORMAL
BH BB UNIT NUMBER: NORMAL
BH BB UNIT NUMBER: NORMAL
CROSSMATCH INTERPRETATION: NORMAL
CROSSMATCH INTERPRETATION: NORMAL
HCT VFR BLD AUTO: 36.7 % (ref 35.6–45.5)
HGB BLD-MCNC: 11.7 G/DL (ref 11.9–15.5)
UNIT  ABO: NORMAL
UNIT  ABO: NORMAL
UNIT  RH: NORMAL
UNIT  RH: NORMAL

## 2017-08-11 PROCEDURE — G8979 MOBILITY GOAL STATUS: HCPCS

## 2017-08-11 PROCEDURE — 85018 HEMOGLOBIN: CPT | Performed by: OBSTETRICS & GYNECOLOGY

## 2017-08-11 PROCEDURE — 25010000002 ENOXAPARIN PER 10 MG: Performed by: OBSTETRICS & GYNECOLOGY

## 2017-08-11 PROCEDURE — G8980 MOBILITY D/C STATUS: HCPCS

## 2017-08-11 PROCEDURE — 85014 HEMATOCRIT: CPT | Performed by: OBSTETRICS & GYNECOLOGY

## 2017-08-11 PROCEDURE — 25010000002 MORPHINE PER 10 MG: Performed by: OBSTETRICS & GYNECOLOGY

## 2017-08-11 PROCEDURE — 97162 PT EVAL MOD COMPLEX 30 MIN: CPT

## 2017-08-11 PROCEDURE — G0378 HOSPITAL OBSERVATION PER HR: HCPCS

## 2017-08-11 PROCEDURE — 25010000002 ONDANSETRON PER 1 MG: Performed by: OBSTETRICS & GYNECOLOGY

## 2017-08-11 PROCEDURE — 63710000001 PROMETHAZINE PER 12.5 MG: Performed by: OBSTETRICS & GYNECOLOGY

## 2017-08-11 PROCEDURE — G8978 MOBILITY CURRENT STATUS: HCPCS

## 2017-08-11 RX ADMIN — NITROFURANTOIN MONOHYDRATE/MACROCRYSTALLINE 100 MG: 25; 75 CAPSULE ORAL at 09:14

## 2017-08-11 RX ADMIN — ESTRADIOL 1 MG: 1 TABLET ORAL at 09:14

## 2017-08-11 RX ADMIN — DEXTROSE AND SODIUM CHLORIDE 100 ML/HR: 5; 450 INJECTION, SOLUTION INTRAVENOUS at 06:35

## 2017-08-11 RX ADMIN — DOCUSATE SODIUM 100 MG: 100 CAPSULE, LIQUID FILLED ORAL at 09:11

## 2017-08-11 RX ADMIN — OXYCODONE HYDROCHLORIDE AND ACETAMINOPHEN 1 TABLET: 5; 325 TABLET ORAL at 16:18

## 2017-08-11 RX ADMIN — PROMETHAZINE HYDROCHLORIDE 12.5 MG: 12.5 TABLET ORAL at 09:11

## 2017-08-11 RX ADMIN — MORPHINE SULFATE 2 MG: 2 INJECTION, SOLUTION INTRAMUSCULAR; INTRAVENOUS at 03:57

## 2017-08-11 RX ADMIN — OXYCODONE HYDROCHLORIDE AND ACETAMINOPHEN 1 TABLET: 5; 325 TABLET ORAL at 09:12

## 2017-08-11 RX ADMIN — ONDANSETRON 4 MG: 2 INJECTION INTRAMUSCULAR; INTRAVENOUS at 03:57

## 2017-08-11 RX ADMIN — ENOXAPARIN SODIUM 40 MG: 40 INJECTION SUBCUTANEOUS at 09:11

## 2017-08-11 RX ADMIN — OXYCODONE HYDROCHLORIDE AND ACETAMINOPHEN 1 TABLET: 5; 325 TABLET ORAL at 12:58

## 2017-08-11 RX ADMIN — MORPHINE SULFATE 2 MG: 2 INJECTION, SOLUTION INTRAMUSCULAR; INTRAVENOUS at 00:29

## 2017-08-11 RX ADMIN — MORPHINE SULFATE 2 MG: 2 INJECTION, SOLUTION INTRAMUSCULAR; INTRAVENOUS at 06:04

## 2017-08-11 NOTE — PROGRESS NOTES
Continued Stay Note  [facility]     Patient Name: Cary Correa  MRN: [id number]  Today's Date: 8/11/2017    Admit Date: 8/10/2017          Discharge Plan       08/11/17 1543    Final Note    Final Note 01:Home w/o needs              Discharge Codes     None        Expected Discharge Date and Time     Expected Discharge Date Expected Discharge Time    Aug 11, 2017             Analilia Leong RN

## 2017-08-11 NOTE — PLAN OF CARE
Problem: Patient Care Overview (Adult)  Goal: Plan of Care Review  Outcome: Ongoing (interventions implemented as appropriate)    08/11/17 0418   Coping/Psychosocial Response Interventions   Plan Of Care Reviewed With patient   Patient Care Overview   Progress no change   Outcome Evaluation   Outcome Summary/Follow up Plan C/O moderate to severe pain. Medicated. Packing intact with small vaginal bleeding. Unable to walk due to nausea and drowsiness. Urine output adequate.       Goal: Adult Individualization and Mutuality  Outcome: Ongoing (interventions implemented as appropriate)  Goal: Discharge Needs Assessment  Outcome: Ongoing (interventions implemented as appropriate)    Problem: Pain, Acute (Adult)  Goal: Identify Related Risk Factors and Signs and Symptoms  Outcome: Outcome(s) achieved Date Met:  08/11/17  Goal: Acceptable Pain Control/Comfort Level  Outcome: Ongoing (interventions implemented as appropriate)

## 2017-08-11 NOTE — PLAN OF CARE
Problem: Perioperative Period (Adult)  Goal: Signs and Symptoms of Listed Potential Problems Will be Absent or Manageable (Perioperative Period)  Outcome: Ongoing (interventions implemented as appropriate)    08/10/17 211   Perioperative Period   Problems Assessed (Perioperative Period) pain   Problems Present (Perioperative Period) pain   INTERMITTENT EPISODIC NAUSEA. PATIENT REPORTS ADEQUATELY CONTROLLED PAIN. MODERATE PERIPAD DRAINAGE, PERIPAD CHANGED X2 WHILE IN RECOVERY DUE TO MODERATE AMOUNT DRAINAGE. VAGINAL PACKING INPLACE

## 2017-08-11 NOTE — PLAN OF CARE
Problem: Patient Care Overview (Adult)  Goal: Plan of Care Review    08/11/17 1096   Coping/Psychosocial Response Interventions   Plan Of Care Reviewed With patient   Outcome Evaluation   Outcome Summary/Follow up Plan Patient in agreement to PT eval. Patient presents with impaired funct mobility and pain, due to surgical prodedure. Patient assisted with transfers and ambulation using RW. Multiple standing rest breaks required due to increased pain in RLQ. Patient may benefit from skilled PT acutely to address funct deficits- would recommend home with family/assist and RW at this time.

## 2017-08-11 NOTE — THERAPY EVALUATION
Acute Care - Physical Therapy Initial Evaluation  [facility]     Patient Name: Cary Correa  : 1960  MRN: [id number]  Today's Date: 2017      Date of Referral to PT: 17  Referring Physician: Dr. Alvarez      Admit Date: 8/10/2017     Visit Dx:    ICD-10-CM ICD-9-CM   1. Skin lesion L98.9 709.9   2. Impaired gait and mobility R26.89 781.2     Patient Active Problem List   Diagnosis   • Pelvic relaxation   • Prolapse of vaginal vault after hysterectomy   • Cystocele, midline   • Rectocele   • Vaginal enterocele, congenital or acquired   • Incomplete defecation   • Vaginal scar   • Loss of perineal body, female   • Vulvar lesion   • Vaginal vault prolapse     Past Medical History:   Diagnosis Date   • Cystocele    • Enterocele    • Prolapse of uterus    • Rectocele    • Skin lesion     MONS PUBIS     Past Surgical History:   Procedure Laterality Date   • ANTERIOR AND POSTERIOR VAGINAL REPAIR N/A 2016    Procedure: ANTERIOR AND POSTERIOR  REPAIR;  Surgeon: Dilip Bond MD;  [place] [facility] [place] [facility] [place];  Service:    • ANTERIOR AND POSTERIOR VAGINAL REPAIR N/A 8/10/2017    Procedure:  ANTERIOR & POSTERIOR COLPORRHAPHY, ABDOMINAL ENTERCELE REPAIR, CYSTOURETHROSCOPY, RELEASE VAGINAL SCAR BAND, RECONSTRUCT PERINEAL BODY, REMOVE LESION ON MONSPUBIS;  Surgeon: Sonia Alvarez MD;  Location: [facility] OR;  Service:    • LAPAROSCOPIC ASSISTED VAGINAL HYSTERECTOMY N/A 2016    Procedure: LAVH AND BSO;  Surgeon: Dilip Bond MD;  Location: [facility] OR [facility];  Service:    • SACROCOLPOPEXY N/A 8/10/2017    Procedure: SACRAL COLPOPEXY, SACROSPINOUS LIGAMENT FIXATION, EXTRAPERITONEAL COLPOPEXY,;  Surgeon: Sonia Alvarez MD;  Location: [facility] MAIN OR;  Service:    • UTEROSACRAL SUSPENSION LAPAROSCOPIC N/A 8/10/2017    Procedure: LAPAROSCOPIC UTEROSACRAL LIGMENT SUSPENSION, ;  Surgeon: Sonia Alvarez MD;  Location: [facility] OR;  Service:           PT ASSESSMENT (last 72 hours)      PT Evaluation        08/11/17 1501 08/10/17 2021    Rehab Evaluation    Document Type evaluation  -MA     Subjective Information agree to therapy;complains of;pain  -MA     Patient Effort, Rehab Treatment good  -MA     Symptoms Noted During/After Treatment increased pain  -MA     General Information    Patient Profile Review yes  -MA     Referring Physician Dr. Alvarez  -MA     Precautions/Limitations fall precautions  -MA     Prior Level of Function independent:;all household mobility;community mobility;gait;transfer  -MA     Equipment Currently Used at Home none  -MA none  -MS    Plans/Goals Discussed With patient;spouse/S.O.  -MA     Barriers to Rehab none identified  -MA     Clinical Impression    Date of Referral to PT 08/11/17  -MA     PT Diagnosis decr endurance, decr funct mobility  -MA     Criteria for Skilled Therapeutic Interventions Met yes;treatment indicated  -MA     Pathology/Pathophysiology Noted (Describe Specifically for Each System) genitourinary  -MA     Impairments Found (describe specific impairments) aerobic capacity/endurance;gait, locomotion, and balance  -MA     Rehab Potential good, to achieve stated therapy goals  -MA     Pain Assessment    Pain Assessment 0-10  -MA     Pain Score 7  -MA     Post Pain Score 3  -MA     Pain Type Acute pain;Surgical pain  -MA     Pain Location Vagina   RLQ  -MA     Pain Descriptors Sharp  -MA     Pain Frequency Intermittent  -MA     Pain Intervention(s) Repositioned  -MA     Vision Assessment/Intervention    Visual Impairment WFL  -MA     Cognitive Assessment/Intervention    Current Cognitive/Communication Assessment functional  -MA     Orientation Status oriented x 4  -MA     Follows Commands/Answers Questions 100% of the time;able to follow multi-step instructions;needs increased time  -MA     Personal Safety WNL/WFL;good awareness, safety precautions  -MA     Personal Safety Interventions fall prevention program maintained;gait belt;nonskid shoes/slippers when out of bed   -MA     ROM (Range of Motion)    General ROM no range of motion deficits identified  -MA     MMT (Manual Muscle Testing)    General MMT Assessment no strength deficits identified  -MA     Transfer Assessment/Treatment    Transfers, Sit-Stand [place] minimum assist (75% patient effort);verbal cues required  -MA     Transfers, Stand-Sit [place] stand by assist;verbal cues required  -MA     Transfers, Sit-Stand-Sit, Assist Device rolling walker  -MA     Gait Assessment/Treatment    Gait, [place] Level contact guard assist;verbal cues required  -MA     Gait, Assistive Device rolling walker  -MA     Gait, Distance (Feet) 25  -MA     Gait, Gait Deviations robert decreased  -MA     Positioning and Restraints    Pre-Treatment Position sitting in chair/recliner  -MA     Post Treatment Position chair  -MA     In Chair sitting;call light within reach;encouraged to call for assist;with family/caregiver;legs elevated  -MA       08/10/17 1051       Living Environment    Lives With spouse  -DK     Living Arrangements house  -DK     Home Accessibility no concerns  -DK     Stair Railings at Home inside, present at both sides  -DK     Type of Financial/Environmental Concern none  -DK     Transportation Available family or friend will provide;car  -DK       User Key  (r) = Recorded By, (t) = Taken By, (c) = Cosigned By    Initials Name Provider Type    HUMA Olmos, RN Registered Nurse    MS Pretty Russell, RN Registered Nurse    LINDSEY Tolliver, PT Physical Therapist          Physical Therapy Education     Title: PT OT SLP Therapies (Active)     Topic: Physical Therapy (Active)     Point: Mobility training (Done)    Learning Progress Summary    Learner Readiness Method Response Comment Documented by Status   Patient Acceptance KENDALL CAMPOS  MA 08/11/17 1514 Done               Point: Body mechanics (Done)    Learning Progress Summary    Learner Readiness Method Response Comment Documented by Status   Patient  Acceptance E BETH  MA 08/11/17 1514 Done               Point: Precautions (Done)    Learning Progress Summary    Learner Readiness Method Response Comment Documented by Status   Patient Acceptance E BETH  MA 08/11/17 1514 Done                      User Key     Initials Effective Dates Name Provider Type Discipline    MA 12/13/16 -  Lyly Tolliver, PT Physical Therapist PT                PT Recommendation and Plan  Anticipated Equipment Needs At Discharge: front wheeled walker  Anticipated Discharge Disposition: home, home with assist  Planned Therapy Interventions: bed mobility training, gait training, patient/family education, postural re-education, strengthening, transfer training  PT Frequency: daily  Plan of Care Review  Plan Of Care Reviewed With: (P) patient  Outcome Summary/Follow up Plan: (P) Patient in agreement to PT eval. Patient presents with impaired funct mobility and pain, due to surgical prodedure. Patient assisted with transfers and ambulation using RW. Multiple standing rest breaks required due to increased pain in RLQ. Patient may benefit from skilled PT acutely to address funct deficits- would recommend home with family/assist and RW at this time.          IP PT Goals       08/11/17 1515          Bed Mobility PT LTG    Bed Mobility PT LTG, Date Established (P)  08/11/17  -MA      Bed Mobility PT LTG, Time to Achieve (P)  1 wk  -MA      Bed Mobility PT LTG, Activity Type (P)  all bed mobility  -MA      Bed Mobility PT LTG, [place] Level (P)  supervision required  -MA      Transfer Training PT LTG    Transfer Training PT LTG, Date Established (P)  08/11/17  -MA      Transfer Training PT LTG, Time to Achieve (P)  1 wk  -MA      Transfer Training PT LTG, Activity Type (P)  all transfers  -MA      Transfer Training PT LTG, [place] Level (P)  supervision required  -MA      Gait Training PT LTG    Gait Training Goal PT LTG, Date Established (P)  08/11/17  -MA      Gait Training Goal PT LTG,  Time to Achieve (P)  1 wk  -MA      Gait Training Goal PT LTG, [place] Level (P)  supervision required  -MA      Gait Training Goal PT LTG, Distance to Achieve (P)  250  -MA        User Key  (r) = Recorded By, (t) = Taken By, (c) = Cosigned By    Initials Name Provider Type    LINDSEY Tolliver PT Physical Therapist                Outcome Measures       08/11/17 1500          How much help from another person do you currently need...    Turning from your back to your side while in flat bed without using bedrails? 4  -MA      Moving from lying on back to sitting on the side of a flat bed without bedrails? 4  -MA      Moving to and from a bed to a chair (including a wheelchair)? 3  -MA      Standing up from a chair using your arms (e.g., wheelchair, bedside chair)? 3  -MA      Climbing 3-5 steps with a railing? 3  -MA      To walk in hospital room? 3  -MA      AM-PAC 6 Clicks Score 20  -MA      Functional Assessment    Outcome Measure Options AM-PAC 6 Clicks Basic Mobility (PT)  -MA        User Key  (r) = Recorded By, (t) = Taken By, (c) = Cosigned By    Initials Name Provider Type    LINDSEY Tolliver PT Physical Therapist           Time Calculation:         PT Charges       08/11/17 1520          Time Calculation    Start Time 1435  -MA      Stop Time 1455  -MA      Time Calculation (min) 20 min  -MA      PT Received On 08/11/17  -MA      PT - Next Appointment 08/12/17  -MA      PT Goal Re-Cert Due Date 08/18/17  -MA        User Key  (r) = Recorded By, (t) = Taken By, (c) = Cosigned By    Initials Name Provider Type    LINDSEY Tolliver PT Physical Therapist          Therapy Charges for Today     Code Description Service Date Service Provider Modifiers Qty    98346556788 HC PT MOBILITY CURRENT 8/11/2017 Lyly Tolliver PT GP, CJ 1    60715787631 HC PT MOBILITY PROJECTED 8/11/2017 Lyly Tolliver, PT GP, CI 1    28081407776 HC PT MOBILITY DISCHARGE 8/11/2017 Lyly Tolliver PT GP, CI 1     [id number]  PT EVAL MOD COMPLEXITY 2 8/11/2017 Lyly Tolliver, PT GP 1          PT G-Codes  PT Professional Judgement Used?: Yes  Outcome Measure Options: AM-PAC 6 Clicks Basic Mobility (PT)  Functional Limitation: Mobility: Walking and moving around  Mobility: Walking and Moving Around Current Status (): At least 20 percent but less than 40 percent impaired, limited or restricted  Mobility: Walking and Moving Around Goal Status (): At least 1 percent but less than 20 percent impaired, limited or restricted  Mobility: Walking and Moving Around Discharge Status (): At least 1 percent but less than 20 percent impaired, limited or restricted      Lyly Tolliver, PT  8/11/2017

## 2017-08-14 LAB
CYTO UR: NORMAL
LAB AP CASE REPORT: NORMAL
Lab: NORMAL
PATH REPORT.FINAL DX SPEC: NORMAL
PATH REPORT.GROSS SPEC: NORMAL

## 2017-08-20 NOTE — OP NOTE
FACILITY: [facility]  PATIENT NAME/: LIZETT MARTINEZ        1960     DATE OF OPERATION(S)/PROCEDURE(S): 08/10/2017     PREOPERATIVE DIAGNOSES:   1. Vaginal vault prolapse, N99.3.   2. Cystocele, N81.11, N81.12.  3. Rectocele, N81.6.   4. Enterocele, N81.5.   5. Obstructed defecation, R15.0.   6. Loss of perineal body, N81.89.  7. Lesion of vulva/mons, N90.89.  8. Vaginal scar, N89.8.    POSTOPERATIVE DIAGNOSES: Same      SURGEON:  Sonia Alvarez M.D.     ASSISTANT: JUANITO Talley      Scrub Nurse: Kaylin Rutledge R.N., B.S.N.     PROCEDURE(S) PERFORMED:   1. Laparoscopic uterosacral ligament colpopexy, 86349.   2. Abdominal laparoscopic enterocele repair, 53866.   3. Extraperitoneal colpopexy sacrospinous ligament fixation,     35112.   4. Anterior and Posterior colporrhaphy with perineocele repair      and revision of vaginal scar band, 95034.   5. Insertion of porcine graft posterior vagina, 99940.  6. Vulvar lesion removal, 02557.   6. Cystourethroscopy, 42529.      FINDING(S): On cystourethroscopy, following all the procedures, there was bilateral efflux of pyridium stained urine from both the right and left ureteral orifices. There were no lesions or foreign material of the bladder or the urethra. The uterus, cervix, fallopian tubes and ovaries were surgically absent.     DESCRIPTION OF PROCEDURE(S): The patient was taken to the Operating Room with peripheral IV in place. She underwent the induction of general endotracheal anesthesia, was prepped and draped in the dorsal lithotomy position in [facility]. Cystourethroscopy showed no lesions or foreign material of the bladder or the urethra and there is bilateral efflux of pyridium stained urine from both the right and left ureteral orifices. The cystoscope was removed and a Pineda catheter was placed and set to straight drainage.      A left upper quadrant skin incision was made, a Veress needle inserted and a pneumoperitoneum  introduced. The Veress needle was removed and a 5 millimeter Optiview was placed in the left upper quadrant under direct visualization. An 11 millimeter left lateral and 5 millimeter subumbilical, suprapubic and right lateral ports were placed, all under direct visualization. The patient was placed in Trendelenburg and the bowel lifted superiorly. With a Breisky retractor in the vagina, the uterosacral ligaments were placed on stretch and marking sutures were placed in the right uterosacral ligament and the left uterosacral ligament and tied down bilaterally. With a combination of a Breisky and the Lucite obturator, the pubocervical serosa was dissected from the pubocervical fascia and the rectovaginal serosa was dissected from the rectovaginal fascia. #1 Prolene was used to take the right uterosacral ligament, the right rectovaginal fascia, the right pubocervical fascia and held. #1 Prolene was used to take the left uterosacral ligament, the left rectovaginal fascia, the left pubocervical fascia and these sutures were tied down. A laparoscopic enterocele repair was performed attaching the detached superiormost portion of the rectovaginal and the pubocervical fascia to obliterate the enterocele, and this was attached to the left and the right uterosacral ligament. These sutures were all tied down and hemostasis was excellent. There were no fallopian tubes or ovaries present, these were surgically absent as was the uterus and cervix. The 11 millimeter port was closed with a Brent Irish SureClose with 0 Vicryl. The remainder of the ports were removed under direct visualization and all were hemostatic. The skin incisions were closed with 4-0 Monocryl.      The patient's legs were raised in lithotomy and an anterior vaginal incision was made and the bladder dissected from the vaginal epithelium. The anterior colporrhaphy was performed with interrupted 2-0 PDS plicating the pubocervical fascia in the midline. The  anterior vaginal epithelium was closed with 2-0 vicryl and was hemostatic. A posterior vaginal incision was made and the rectum dissected from the vaginal epithelium. The dissection continued until the sacrospinous ligaments were located bilaterally. Zero permanent suture on a Capio slim was place through the right and the left sacrospinous ligaments for the extraperitoneal colpopexy sacrospinous ligment fixation with intervening biologic graft. Multiple interrupted 0 Vicryl sutures were used to plicate the rectovaginal fascia in the midline and to remove the vaginal scar and to reconstruct the perineal body. A T-shaped graft of biologic porcine Strattice tissue was placed in the posterior vaginal compartment and anchored to sacrospinous ligament sutures bilaterally. The posterior vaginal epithelium was closed with 2-0 Vicryl and was hemostatic. Cystourethroscopy showed no lesions or foreign material of the bladder or the urethra and there is bilateral efflux of pyridium stained urine from both the right and the left ureteral orifices. The cystoscope was removed and a Pineda catheter was placed and set to straight drainage. An estrogen permeated vaginal pack was placed into the vagina. The mons vulvar lesion was removed in its entirety and made hemostatic with cautery. The patient was taken out of the dorsal lithotomy position, awakened from general anesthesia and taken to the Recovery Room in good condition.      COUNTS: All final needle, sponge, and instrument counts were correct.      ESTIMATED BLOOD LOSS: 200 milliliters.      FLUIDS: IV fluid input was 2700 milliliters.      URINE OUTPUT: 100 milliliters.      COMPLICATIONS: There were no complications to the procedure.        PETER SMITH M.D.

## 2017-09-18 ENCOUNTER — OFFICE VISIT (OUTPATIENT)
Dept: INTERNAL MEDICINE | Facility: CLINIC | Age: 57
End: 2017-09-18

## 2017-09-18 VITALS
BODY MASS INDEX: 30.73 KG/M2 | OXYGEN SATURATION: 98 % | WEIGHT: 180 LBS | HEART RATE: 75 BPM | SYSTOLIC BLOOD PRESSURE: 110 MMHG | DIASTOLIC BLOOD PRESSURE: 80 MMHG | HEIGHT: 64 IN

## 2017-09-18 DIAGNOSIS — J01.00 SUBACUTE MAXILLARY SINUSITIS: Primary | ICD-10-CM

## 2017-09-18 DIAGNOSIS — J33.9 NASAL POLYP: ICD-10-CM

## 2017-09-18 PROCEDURE — 99213 OFFICE O/P EST LOW 20 MIN: CPT | Performed by: INTERNAL MEDICINE

## 2017-09-18 RX ORDER — DOXYCYCLINE HYCLATE 100 MG
100 TABLET ORAL DAILY
Qty: 7 TABLET | Refills: 0 | Status: SHIPPED | OUTPATIENT
Start: 2017-09-18 | End: 2017-09-25

## 2017-09-18 RX ORDER — METHYLPREDNISOLONE 4 MG/1
TABLET ORAL
Qty: 21 TABLET | Refills: 0 | Status: SHIPPED | OUTPATIENT
Start: 2017-09-18 | End: 2018-09-13

## 2017-09-18 NOTE — PROGRESS NOTES
Subjective   Cary Correa is a 57 y.o. female.     Earache    There is pain in both ears. Associated symptoms include headaches (Frontal H/A X 2 days) and rhinorrhea (Mild ).        The following portions of the patient's history were reviewed and updated as appropriate: allergies, current medications, past family history, past medical history, past social history, past surgical history and problem list.    Review of Systems   HENT: Positive for ear pain ( Has discomfort in both ears for a week ) and rhinorrhea (Mild ). Negative for sinus pressure.    Respiratory: Positive for wheezing ( Notices some rattling in chest).    Neurological: Positive for headaches (Frontal H/A X 2 days).       Objective   Physical Exam   Constitutional: She is oriented to person, place, and time.   HENT:   Head: Normocephalic.   Right Ear: External ear normal.   Left Ear: External ear normal.   Mouth/Throat: Oropharynx is clear and moist.   Mild tenderness over maxillary sinuses  Appears to havbe polyp L nares   Neck: Neck supple.   Cardiovascular: Normal rate, regular rhythm and normal heart sounds.    Repeat 126/80   Pulmonary/Chest: Effort normal and breath sounds normal.   Musculoskeletal: Normal range of motion.   Neurological: She is alert and oriented to person, place, and time.   Vitals reviewed.      Assessment/Plan   Cary was seen today for earache.    Diagnoses and all orders for this visit:    Subacute maxillary sinusitis  -     doxycycline (VIBRAMYICN) 100 MG tablet; Take 1 tablet by mouth Daily for 7 days.  -     MethylPREDNISolone (MEDROL) 4 MG tablet; follow package directions    Advised usual over-the-counter medications

## 2017-09-29 RX ORDER — ESTRADIOL 1 MG/1
1 TABLET ORAL DAILY
Qty: 30 TABLET | Refills: 0 | Status: SHIPPED | OUTPATIENT
Start: 2017-09-29 | End: 2017-12-26 | Stop reason: SDUPTHER

## 2017-12-26 ENCOUNTER — TELEPHONE (OUTPATIENT)
Dept: INTERNAL MEDICINE | Facility: CLINIC | Age: 57
End: 2017-12-26

## 2017-12-26 RX ORDER — ESTRADIOL 1 MG/1
1 TABLET ORAL DAILY
Qty: 30 TABLET | Refills: 0 | Status: SHIPPED | OUTPATIENT
Start: 2017-12-26 | End: 2018-02-20 | Stop reason: SDUPTHER

## 2017-12-26 NOTE — TELEPHONE ENCOUNTER
----- Message from Alicia Fraser sent at 12/26/2017 10:30 AM EST -----  Contact: Pt  Pt would like a 90 day refill for    estradiol (ESTRACE) 1 MG tablet      [facility]/pharmacy #[id number] - [place], [place] - [address]  - [phone]  - [phone] FX    Pt#  [phone]

## 2018-02-20 RX ORDER — ESTRADIOL 1 MG/1
TABLET ORAL
Qty: 30 TABLET | Refills: 5 | Status: SHIPPED | OUTPATIENT
Start: 2018-02-20 | End: 2018-08-30 | Stop reason: SDUPTHER

## 2018-03-01 ENCOUNTER — TELEPHONE (OUTPATIENT)
Dept: INTERNAL MEDICINE | Facility: CLINIC | Age: 58
End: 2018-03-01

## 2018-03-01 NOTE — TELEPHONE ENCOUNTER
----- Message from Karrie Banks sent at 3/1/2018 11:44 AM EST -----  Contact: Patient   Patient has CPE scheduled, and also labs scheduled 03/09/2018.  Need orders in chart please.  Thanks.

## 2018-03-02 DIAGNOSIS — Z00.00 ANNUAL PHYSICAL EXAM: Primary | ICD-10-CM

## 2018-08-30 RX ORDER — ESTRADIOL 1 MG/1
TABLET ORAL
Qty: 30 TABLET | Refills: 5 | Status: SHIPPED | OUTPATIENT
Start: 2018-08-30 | End: 2019-02-05 | Stop reason: SDUPTHER

## 2018-09-13 ENCOUNTER — OFFICE VISIT (OUTPATIENT)
Dept: INTERNAL MEDICINE | Facility: CLINIC | Age: 58
End: 2018-09-13

## 2018-09-13 VITALS
HEIGHT: 64 IN | DIASTOLIC BLOOD PRESSURE: 90 MMHG | SYSTOLIC BLOOD PRESSURE: 132 MMHG | WEIGHT: 174 LBS | BODY MASS INDEX: 29.71 KG/M2

## 2018-09-13 DIAGNOSIS — Z12.11 COLON CANCER SCREENING: ICD-10-CM

## 2018-09-13 DIAGNOSIS — Z00.00 ANNUAL PHYSICAL EXAM: Primary | ICD-10-CM

## 2018-09-13 DIAGNOSIS — R53.83 FATIGUE, UNSPECIFIED TYPE: ICD-10-CM

## 2018-09-13 DIAGNOSIS — Z12.31 ENCOUNTER FOR SCREENING MAMMOGRAM FOR BREAST CANCER: ICD-10-CM

## 2018-09-13 PROBLEM — N89.8 VAGINAL GRANULATION TISSUE: Status: ACTIVE | Noted: 2018-02-15

## 2018-09-13 PROBLEM — T81.31XA SUPERFICIAL DEHISCENCE OF OPERATION WOUND: Status: ACTIVE | Noted: 2018-02-15

## 2018-09-13 PROBLEM — R33.9 INCOMPLETE EMPTYING OF BLADDER: Status: ACTIVE | Noted: 2017-12-29

## 2018-09-13 PROBLEM — N39.3 FEMALE STRESS INCONTINENCE: Status: ACTIVE | Noted: 2017-11-14

## 2018-09-13 PROBLEM — N32.0 BLADDER NECK OBSTRUCTION: Status: ACTIVE | Noted: 2017-12-29

## 2018-09-13 PROBLEM — N81.10 CYSTOURETHROCELE: Status: ACTIVE | Noted: 2017-11-14

## 2018-09-13 PROBLEM — K62.89 HYPERTROPHIED ANAL PAPILLA: Status: ACTIVE | Noted: 2017-11-14

## 2018-09-13 LAB
25(OH)D3 SERPL-MCNC: 25.7 NG/ML (ref 30–100)
ALBUMIN SERPL-MCNC: 4.6 G/DL (ref 3.5–5.2)
ALBUMIN/GLOB SERPL: 1.4 G/DL
ALP SERPL-CCNC: 73 U/L (ref 39–117)
ALT SERPL W P-5'-P-CCNC: 11 U/L (ref 1–33)
ANION GAP SERPL CALCULATED.3IONS-SCNC: 10.6 MMOL/L
AST SERPL-CCNC: 17 U/L (ref 1–32)
BACTERIA UR QL AUTO: ABNORMAL /HPF
BILIRUB SERPL-MCNC: 0.4 MG/DL (ref 0.1–1.2)
BILIRUB UR QL STRIP: NEGATIVE
BUN BLD-MCNC: 10 MG/DL (ref 6–20)
BUN/CREAT SERPL: 11.8 (ref 7–25)
CALCIUM SPEC-SCNC: 9.3 MG/DL (ref 8.6–10.5)
CHLORIDE SERPL-SCNC: 105 MMOL/L (ref 98–107)
CHOLEST SERPL-MCNC: 198 MG/DL (ref 0–200)
CLARITY UR: CLEAR
CO2 SERPL-SCNC: 25.4 MMOL/L (ref 22–29)
COLOR UR: YELLOW
CREAT BLD-MCNC: 0.85 MG/DL (ref 0.57–1)
DEPRECATED RDW RBC AUTO: 48.5 FL (ref 37–54)
ERYTHROCYTE [DISTWIDTH] IN BLOOD BY AUTOMATED COUNT: 14.9 % (ref 11.5–15)
GFR SERPL CREATININE-BSD FRML MDRD: 69 ML/MIN/1.73
GLOBULIN UR ELPH-MCNC: 3.2 GM/DL
GLUCOSE BLD-MCNC: 95 MG/DL (ref 65–99)
GLUCOSE UR STRIP-MCNC: NEGATIVE MG/DL
HCT VFR BLD AUTO: 43.3 % (ref 34.1–44.9)
HDLC SERPL-MCNC: 55 MG/DL (ref 40–60)
HGB BLD-MCNC: 14 G/DL (ref 11.2–15.7)
HGB UR QL STRIP.AUTO: ABNORMAL
HYALINE CASTS UR QL AUTO: ABNORMAL /LPF
KETONES UR QL STRIP: NEGATIVE
LDLC SERPL CALC-MCNC: 127 MG/DL (ref 0–100)
LDLC/HDLC SERPL: 2.3 {RATIO}
LEUKOCYTE ESTERASE UR QL STRIP.AUTO: NEGATIVE
MCH RBC QN AUTO: 29.4 PG (ref 26–34)
MCHC RBC AUTO-ENTMCNC: 32.3 G/DL (ref 31–37)
MCV RBC AUTO: 91 FL (ref 80–100)
MUCOUS THREADS URNS QL MICRO: ABNORMAL /HPF
NITRITE UR QL STRIP: NEGATIVE
PH UR STRIP.AUTO: 7 [PH] (ref 5–8)
PLATELET # BLD AUTO: 238 10*3/MM3 (ref 150–450)
PMV BLD AUTO: 11.4 FL (ref 6–12)
POTASSIUM BLD-SCNC: 4.4 MMOL/L (ref 3.5–5.2)
PROT SERPL-MCNC: 7.8 G/DL (ref 6–8.5)
PROT UR QL STRIP: NEGATIVE
RBC # BLD AUTO: 4.76 10*6/MM3 (ref 3.93–5.22)
RBC # UR: ABNORMAL /HPF
REF LAB TEST METHOD: ABNORMAL
SODIUM BLD-SCNC: 141 MMOL/L (ref 136–145)
SP GR UR STRIP: 1.01 (ref 1–1.03)
SQUAMOUS #/AREA URNS HPF: ABNORMAL /HPF
TRIGL SERPL-MCNC: 82 MG/DL (ref 0–150)
TSH SERPL DL<=0.05 MIU/L-ACNC: 1.39 MIU/ML (ref 0.27–4.2)
UROBILINOGEN UR QL STRIP: ABNORMAL
VIT B12 BLD-MCNC: 470 PG/ML (ref 211–946)
VLDLC SERPL-MCNC: 16.4 MG/DL (ref 5–40)
WBC NRBC COR # BLD: 7.15 10*3/MM3 (ref 5–10)
WBC UR QL AUTO: ABNORMAL /HPF

## 2018-09-13 PROCEDURE — 82306 VITAMIN D 25 HYDROXY: CPT | Performed by: NURSE PRACTITIONER

## 2018-09-13 PROCEDURE — 99396 PREV VISIT EST AGE 40-64: CPT | Performed by: NURSE PRACTITIONER

## 2018-09-13 PROCEDURE — 81001 URINALYSIS AUTO W/SCOPE: CPT | Performed by: NURSE PRACTITIONER

## 2018-09-13 PROCEDURE — 80061 LIPID PANEL: CPT | Performed by: NURSE PRACTITIONER

## 2018-09-13 PROCEDURE — 82607 VITAMIN B-12: CPT | Performed by: NURSE PRACTITIONER

## 2018-09-13 PROCEDURE — 36415 COLL VENOUS BLD VENIPUNCTURE: CPT | Performed by: NURSE PRACTITIONER

## 2018-09-13 PROCEDURE — 84443 ASSAY THYROID STIM HORMONE: CPT | Performed by: NURSE PRACTITIONER

## 2018-09-13 PROCEDURE — 85027 COMPLETE CBC AUTOMATED: CPT | Performed by: NURSE PRACTITIONER

## 2018-09-13 PROCEDURE — 80053 COMPREHEN METABOLIC PANEL: CPT | Performed by: NURSE PRACTITIONER

## 2018-09-13 NOTE — PATIENT INSTRUCTIONS
Health Maintenance for Postmenopausal Women  Menopause is a normal process in which your reproductive ability comes to an end. This process happens gradually over a span of months to years, usually between the ages of 48 and 55. Menopause is complete when you have missed 12 consecutive menstrual periods.  It is important to talk with your health care provider about some of the most common conditions that affect postmenopausal women, such as heart disease, cancer, and bone loss (osteoporosis). Adopting a healthy lifestyle and getting preventive care can help to promote your health and wellness. Those actions can also lower your chances of developing some of these common conditions.  What should I know about menopause?  During menopause, you may experience a number of symptoms, such as:  · Moderate-to-severe hot flashes.  · Night sweats.  · Decrease in sex drive.  · Mood swings.  · Headaches.  · Tiredness.  · Irritability.  · Memory problems.  · Insomnia.    Choosing to treat or not to treat menopausal changes is an individual decision that you make with your health care provider.  What should I know about hormone replacement therapy and supplements?  Hormone therapy products are effective for treating symptoms that are associated with menopause, such as hot flashes and night sweats. Hormone replacement carries certain risks, especially as you become older. If you are thinking about using estrogen or estrogen with progestin treatments, discuss the benefits and risks with your health care provider.  What should I know about heart disease and stroke?  Heart disease, heart attack, and stroke become more likely as you age. This may be due, in part, to the hormonal changes that your body experiences during menopause. These can affect how your body processes dietary fats, triglycerides, and cholesterol. Heart attack and stroke are both medical emergencies.  There are many things that you can do to help prevent heart disease  and stroke:  · Have your blood pressure checked at least every 1-2 years. High blood pressure causes heart disease and increases the risk of stroke.  · If you are 55-79 years old, ask your health care provider if you should take aspirin to prevent a heart attack or a stroke.  · Do not use any tobacco products, including cigarettes, chewing tobacco, or electronic cigarettes. If you need help quitting, ask your health care provider.  · It is important to eat a healthy diet and maintain a healthy weight.  ? Be sure to include plenty of vegetables, fruits, low-fat dairy products, and lean protein.  ? Avoid eating foods that are high in solid fats, added sugars, or salt (sodium).  · Get regular exercise. This is one of the most important things that you can do for your health.  ? Try to exercise for at least 150 minutes each week. The type of exercise that you do should increase your heart rate and make you sweat. This is known as moderate-intensity exercise.  ? Try to do strengthening exercises at least twice each week. Do these in addition to the moderate-intensity exercise.  · Know your numbers. Ask your health care provider to check your cholesterol and your blood glucose. Continue to have your blood tested as directed by your health care provider.    What should I know about cancer screening?  There are several types of cancer. Take the following steps to reduce your risk and to catch any cancer development as early as possible.  Breast Cancer  · Practice breast self-awareness.  ? This means understanding how your breasts normally appear and feel.  ? It also means doing regular breast self-exams. Let your health care provider know about any changes, no matter how small.  · If you are 40 or older, have a clinician do a breast exam (clinical breast exam or CBE) every year. Depending on your age, family history, and medical history, it may be recommended that you also have a yearly breast X-ray (mammogram).  · If you  have a family history of breast cancer, talk with your health care provider about genetic screening.  · If you are at high risk for breast cancer, talk with your health care provider about having an MRI and a mammogram every year.  · Breast cancer (BRCA) gene test is recommended for women who have family members with BRCA-related cancers. Results of the assessment will determine the need for genetic counseling and BRCA1 and for BRCA2 testing. BRCA-related cancers include these types:  ? Breast. This occurs in males or females.  ? Ovarian.  ? Tubal. This may also be called fallopian tube cancer.  ? Cancer of the abdominal or pelvic lining (peritoneal cancer).  ? Prostate.  ? Pancreatic.    Cervical, Uterine, and Ovarian Cancer  Your health care provider may recommend that you be screened regularly for cancer of the pelvic organs. These include your ovaries, uterus, and vagina. This screening involves a pelvic exam, which includes checking for microscopic changes to the surface of your cervix (Pap test).  · For women ages 21-65, health care providers may recommend a pelvic exam and a Pap test every three years. For women ages 30-65, they may recommend the Pap test and pelvic exam, combined with testing for human papilloma virus (HPV), every five years. Some types of HPV increase your risk of cervical cancer. Testing for HPV may also be done on women of any age who have unclear Pap test results.  · Other health care providers may not recommend any screening for nonpregnant women who are considered low risk for pelvic cancer and have no symptoms. Ask your health care provider if a screening pelvic exam is right for you.  · If you have had past treatment for cervical cancer or a condition that could lead to cancer, you need Pap tests and screening for cancer for at least 20 years after your treatment. If Pap tests have been discontinued for you, your risk factors (such as having a new sexual partner) need to be  reassessed to determine if you should start having screenings again. Some women have medical problems that increase the chance of getting cervical cancer. In these cases, your health care provider may recommend that you have screening and Pap tests more often.  · If you have a family history of uterine cancer or ovarian cancer, talk with your health care provider about genetic screening.  · If you have vaginal bleeding after reaching menopause, tell your health care provider.  · There are currently no reliable tests available to screen for ovarian cancer.    Lung Cancer  Lung cancer screening is recommended for adults 55-80 years old who are at high risk for lung cancer because of a history of smoking. A yearly low-dose CT scan of the lungs is recommended if you:  · Currently smoke.  · Have a history of at least 30 pack-years of smoking and you currently smoke or have quit within the past 15 years. A pack-year is smoking an average of one pack of cigarettes per day for one year.    Yearly screening should:  · Continue until it has been 15 years since you quit.  · Stop if you develop a health problem that would prevent you from having lung cancer treatment.    Colorectal Cancer  · This type of cancer can be detected and can often be prevented.  · Routine colorectal cancer screening usually begins at age 50 and continues through age 75.  · If you have risk factors for colon cancer, your health care provider may recommend that you be screened at an earlier age.  · If you have a family history of colorectal cancer, talk with your health care provider about genetic screening.  · Your health care provider may also recommend using home test kits to check for hidden blood in your stool.  · A small camera at the end of a tube can be used to examine your colon directly (sigmoidoscopy or colonoscopy). This is done to check for the earliest forms of colorectal cancer.  · Direct examination of the colon should be repeated every  5-10 years until age 75. However, if early forms of precancerous polyps or small growths are found or if you have a family history or genetic risk for colorectal cancer, you may need to be screened more often.    Skin Cancer  · Check your skin from head to toe regularly.  · Monitor any moles. Be sure to tell your health care provider:  ? About any new moles or changes in moles, especially if there is a change in a mole's shape or color.  ? If you have a mole that is larger than the size of a pencil eraser.  · If any of your family members has a history of skin cancer, especially at a young age, talk with your health care provider about genetic screening.  · Always use sunscreen. Apply sunscreen liberally and repeatedly throughout the day.  · Whenever you are outside, protect yourself by wearing long sleeves, pants, a wide-brimmed hat, and sunglasses.    What should I know about osteoporosis?  Osteoporosis is a condition in which bone destruction happens more quickly than new bone creation. After menopause, you may be at an increased risk for osteoporosis. To help prevent osteoporosis or the bone fractures that can happen because of osteoporosis, the following is recommended:  · If you are 19-50 years old, get at least 1,000 mg of calcium and at least 600 mg of vitamin D per day.  · If you are older than age 50 but younger than age 70, get at least 1,200 mg of calcium and at least 600 mg of vitamin D per day.  · If you are older than age 70, get at least 1,200 mg of calcium and at least 800 mg of vitamin D per day.    Smoking and excessive alcohol intake increase the risk of osteoporosis. Eat foods that are rich in calcium and vitamin D, and do weight-bearing exercises several times each week as directed by your health care provider.  What should I know about how menopause affects my mental health?  Depression may occur at any age, but it is more common as you become older. Common symptoms of depression  include:  · Low or sad mood.  · Changes in sleep patterns.  · Changes in appetite or eating patterns.  · Feeling an overall lack of motivation or enjoyment of activities that you previously enjoyed.  · Frequent crying spells.    Talk with your health care provider if you think that you are experiencing depression.  What should I know about immunizations?  It is important that you get and maintain your immunizations. These include:  · Tetanus, diphtheria, and pertussis (Tdap) booster vaccine.  · Influenza every year before the flu season begins.  · Pneumonia vaccine.  · Shingles vaccine.    Your health care provider may also recommend other immunizations.  This information is not intended to replace advice given to you by your health care provider. Make sure you discuss any questions you have with your health care provider.  Document Released: 02/09/2007 Document Revised: 07/07/2017 Document Reviewed: 09/20/2016  "[employer] Interactive Patient Education © 2018 "[employer] Inc.    Hemorrhoids  Hemorrhoids are swollen veins in and around the rectum or anus. There are two types of hemorrhoids:  · Internal hemorrhoids. These occur in the veins that are just inside the rectum. They may poke through to the outside and become irritated and painful.  · External hemorrhoids. These occur in the veins that are outside of the anus and can be felt as a painful swelling or hard lump near the anus.    Most hemorrhoids do not cause serious problems, and they can be managed with home treatments such as diet and lifestyle changes. If home treatments do not help your symptoms, procedures can be done to shrink or remove the hemorrhoids.  What are the causes?  This condition is caused by increased pressure in the anal area. This pressure may result from various things, including:  · Constipation.  · Straining to have a bowel movement.  · Diarrhea.  · Pregnancy.  · Obesity.  · Sitting for long periods of time.  · Heavy lifting or other  activity that causes you to strain.  · Anal sex.    What are the signs or symptoms?  Symptoms of this condition include:  · Pain.  · Anal itching or irritation.  · Rectal bleeding.  · Leakage of stool (feces).  · Anal swelling.  · One or more lumps around the anus.    How is this diagnosed?  This condition can often be diagnosed through a visual exam. Other exams or tests may also be done, such as:  · Examination of the rectal area with a gloved hand (digital rectal exam).  · Examination of the anal canal using a small tube (anoscope).  · A blood test, if you have lost a significant amount of blood.  · A test to look inside the colon (sigmoidoscopy or colonoscopy).    How is this treated?  This condition can usually be treated at home. However, various procedures may be done if dietary changes, lifestyle changes, and other home treatments do not help your symptoms. These procedures can help make the hemorrhoids smaller or remove them completely. Some of these procedures involve surgery, and others do not. Common procedures include:  · Rubber band ligation. Rubber bands are placed at the base of the hemorrhoids to cut off the blood supply to them.  · Sclerotherapy. Medicine is injected into the hemorrhoids to shrink them.  · Infrared coagulation. A type of light energy is used to get rid of the hemorrhoids.  · Hemorrhoidectomy surgery. The hemorrhoids are surgically removed, and the veins that supply them are tied off.  · Stapled hemorrhoidopexy surgery. A circular stapling device is used to remove the hemorrhoids and use staples to cut off the blood supply to them.    Follow these instructions at home:  Eating and drinking  · Eat foods that have a lot of fiber in them, such as whole grains, beans, nuts, fruits, and vegetables. Ask your health care provider about taking products that have added fiber (fiber supplements).  · Drink enough fluid to keep your urine clear or pale yellow.  Managing pain and  swelling  · Take warm sitz baths for 20 minutes, 3-4 times a day to ease pain and discomfort.  · If directed, apply ice to the affected area. Using ice packs between sitz baths may be helpful.  ? Put ice in a plastic bag.  ? Place a towel between your skin and the bag.  ? Leave the ice on for 20 minutes, 2-3 times a day.  General instructions  · Take over-the-counter and prescription medicines only as told by your health care provider.  · Use medicated creams or suppositories as told.  · Exercise regularly.  · Go to the bathroom when you have the urge to have a bowel movement. Do not wait.  · Avoid straining to have bowel movements.  · Keep the anal area dry and clean. Use wet toilet paper or moist towelettes after a bowel movement.  · Do not sit on the toilet for long periods of time. This increases blood pooling and pain.  Contact a health care provider if:  · You have increasing pain and swelling that are not controlled by treatment or medicine.  · You have uncontrolled bleeding.  · You have difficulty having a bowel movement, or you are unable to have a bowel movement.  · You have pain or inflammation outside the area of the hemorrhoids.  This information is not intended to replace advice given to you by your health care provider. Make sure you discuss any questions you have with your health care provider.  Document Released: 12/15/2001 Document Revised: 05/17/2017 Document Reviewed: 08/31/2016  [employer] Interactive Patient Education © 2018 [employer]

## 2018-09-13 NOTE — PROGRESS NOTES
Wen Correa is a 58 y.o. female.     Well Adult Physical   Patient here for a comprehensive physical exam.The patient reports no problems    Do you take any herbs or supplements that were not prescribed by a doctor? no   Are you taking calcium supplements? no   Are you taking aspirin daily? no     History:  LMP: No LMP recorded (lmp unknown). Patient has had a hysterectomy.  Menopause at n/a years  Last pap date:   Abnormal pap? no  : 4  Para: 4     She is here for physical. Overall she feels ok. She doesn't exercise routinely. She is up to date of dentist and eye exam .         The following portions of the patient's history were reviewed and updated as appropriate: allergies, current medications, past family history, past medical history, past social history, past surgical history and problem list.    Review of Systems   Constitutional: Positive for fatigue. Negative for appetite change, chills and fever.   HENT: Positive for dental problem and postnasal drip. Negative for congestion, ear pain, hearing loss, mouth sores, nosebleeds, rhinorrhea, sinus pressure, sneezing, sore throat, tinnitus, trouble swallowing and voice change.    Eyes: Positive for visual disturbance (wear glassess ).   Respiratory: Positive for wheezing. Negative for cough, chest tightness and shortness of breath.    Cardiovascular: Negative for chest pain, palpitations and leg swelling.        Varicose veins    Gastrointestinal: Negative for abdominal pain, anal bleeding, blood in stool, constipation, diarrhea, nausea and vomiting.        Rectal hemorrhoids    Endocrine: Negative for cold intolerance, heat intolerance, polydipsia, polyphagia and polyuria.   Genitourinary: Positive for dysuria. Negative for frequency, hematuria and urgency.   Musculoskeletal: Positive for arthralgias (bilateral knees, hips and pelvis, hands, feets ) and back pain (left lower back due to recently lifiting a heavy item, gradually  improving). Negative for gait problem, joint swelling, myalgias, neck pain and neck stiffness.   Skin: Negative for color change and rash.        NEGATIVE BREAST MASS,, NIPPLE DISCHARGE, SKIN CHANGES, OR LUMP IN ARMPIT   Allergic/Immunologic: Positive for environmental allergies.   Neurological: Negative for dizziness, tremors, seizures, syncope, speech difficulty, weakness, numbness and headaches.   Hematological: Negative for adenopathy. Does not bruise/bleed easily.   Psychiatric/Behavioral: Negative for behavioral problems, confusion, decreased concentration, dysphoric mood, sleep disturbance and suicidal ideas. The patient is not nervous/anxious.        Objective   Physical Exam   Constitutional: She appears well-developed and well-nourished.   HENT:   Right Ear: Hearing, tympanic membrane, external ear and ear canal normal.   Left Ear: Hearing, tympanic membrane, external ear and ear canal normal.   Nose: Right sinus exhibits no maxillary sinus tenderness and no frontal sinus tenderness. Left sinus exhibits no maxillary sinus tenderness and no frontal sinus tenderness.   Eyes: Pupils are equal, round, and reactive to light. Conjunctivae, EOM and lids are normal.   Neck: Trachea normal. Neck supple. No JVD present. Carotid bruit is not present. No tracheal deviation present. No thyroid mass and no thyromegaly present.   Cardiovascular: Normal rate, regular rhythm, S1 normal and S2 normal.  Exam reveals no gallop and no friction rub.    No murmur heard.  Pulses:       Carotid pulses are 2+ on the right side, and 2+ on the left side.       Radial pulses are 2+ on the right side, and 2+ on the left side.        Dorsalis pedis pulses are 2+ on the right side, and 2+ on the left side.        Posterior tibial pulses are 2+ on the right side, and 2+ on the left side.   Repeat bp left arm 128/82  No pedal edema    Pulmonary/Chest: Effort normal and breath sounds normal. Chest wall is not dull to percussion. Right  breast exhibits no inverted nipple, no mass, no nipple discharge, no skin change and no tenderness. Left breast exhibits no inverted nipple, no mass, no nipple discharge, no skin change and no tenderness.   Abdominal: Soft. Normal aorta and bowel sounds are normal. She exhibits no abdominal bruit. There is no hepatosplenomegaly. There is no tenderness. There is no rebound and no guarding. No hernia.   Musculoskeletal: She exhibits no edema.        Lumbar back: She exhibits decreased range of motion and tenderness.        Back:    (-) SLR   Lymphadenopathy:        Head (right side): No submental, no submandibular, no tonsillar, no preauricular, no posterior auricular and no occipital adenopathy present.        Head (left side): No submental, no submandibular, no tonsillar, no preauricular, no posterior auricular and no occipital adenopathy present.     She has no cervical adenopathy.     She has no axillary adenopathy.        Right: No supraclavicular adenopathy present.        Left: No supraclavicular adenopathy present.   Neurological: She is alert. She has normal strength. No cranial nerve deficit or sensory deficit. She displays a negative Romberg sign.   Reflex Scores:       Brachioradialis reflexes are 2+ on the right side and 2+ on the left side.       Patellar reflexes are 2+ on the right side and 2+ on the left side.  Skin: Skin is warm and dry.   Psychiatric: She has a normal mood and affect. Her speech is normal and behavior is normal. Judgment and thought content normal. Cognition and memory are normal.   Nursing note and vitals reviewed.      Assessment/Plan   Cary was seen today for annual exam.    Diagnoses and all orders for this visit:    Annual physical exam  -     Comprehensive Metabolic Panel; Future  -     Lipid Panel; Future  -     CBC (No Diff); Future  -     TSH Rfx On Abnormal To Free T4; Future  -     Urinalysis With Microscopic If Indicated (No Culture) - Urine, Clean Catch; Future  -      Comprehensive Metabolic Panel  -     Lipid Panel  -     CBC (No Diff)  -     TSH Rfx On Abnormal To Free T4  -     Urinalysis With Microscopic If Indicated (No Culture) - Urine, Clean Catch  -     Urinalysis, Microscopic Only - Urine, Clean Catch; Future  -     Urinalysis, Microscopic Only - Urine, Clean Catch    Colon cancer screening  Comments:  declines referral to GI for colonoscopy at this time   Orders:  -     Cancel: POC FECAL OCCULT BLOOD BY IMMUNOASSAY    Encounter for screening mammogram for breast cancer  -     Mammo Screening Bilateral With CAD; Future    Fatigue, unspecified type  -     Vitamin B12; Future  -     Vitamin D 25 Hydroxy; Future  -     Vitamin B12  -     Vitamin D 25 Hydroxy    She declines TDAP today.   She was given inform patient regarding shingrix and flu shot  She declines referral for colonoscopy today.   We did discuss her arthralgias and she declines autoimmune panel today.  I did review recent ECG 11/2017 and normal.

## 2018-09-25 ENCOUNTER — OFFICE VISIT (OUTPATIENT)
Dept: INTERNAL MEDICINE | Facility: CLINIC | Age: 58
End: 2018-09-25

## 2018-09-25 ENCOUNTER — APPOINTMENT (OUTPATIENT)
Dept: WOMENS IMAGING | Facility: HOSPITAL | Age: 58
End: 2018-09-25

## 2018-09-25 DIAGNOSIS — Z12.31 ENCOUNTER FOR SCREENING MAMMOGRAM FOR BREAST CANCER: ICD-10-CM

## 2018-09-25 PROCEDURE — 77067 SCR MAMMO BI INCL CAD: CPT | Performed by: INTERNAL MEDICINE

## 2018-09-25 PROCEDURE — 77067 SCR MAMMO BI INCL CAD: CPT | Performed by: RADIOLOGY

## 2018-11-05 RX ORDER — ESTRADIOL 1 MG/1
TABLET ORAL
Qty: 30 TABLET | Refills: 5 | Status: SHIPPED | OUTPATIENT
Start: 2018-11-05 | End: 2019-10-04

## 2019-02-05 RX ORDER — ESTRADIOL 1 MG/1
1 TABLET ORAL DAILY
Qty: 90 TABLET | Refills: 2 | Status: SHIPPED | OUTPATIENT
Start: 2019-02-05 | End: 2019-11-24 | Stop reason: SDUPTHER

## 2019-10-03 DIAGNOSIS — Z12.31 ENCOUNTER FOR SCREENING MAMMOGRAM FOR BREAST CANCER: Primary | ICD-10-CM

## 2019-10-04 ENCOUNTER — APPOINTMENT (OUTPATIENT)
Dept: WOMENS IMAGING | Facility: HOSPITAL | Age: 59
End: 2019-10-04

## 2019-10-04 ENCOUNTER — OFFICE VISIT (OUTPATIENT)
Dept: INTERNAL MEDICINE | Facility: CLINIC | Age: 59
End: 2019-10-04

## 2019-10-04 VITALS
DIASTOLIC BLOOD PRESSURE: 80 MMHG | RESPIRATION RATE: 18 BRPM | BODY MASS INDEX: 30.63 KG/M2 | SYSTOLIC BLOOD PRESSURE: 110 MMHG | WEIGHT: 179.4 LBS | OXYGEN SATURATION: 99 % | HEIGHT: 64 IN | HEART RATE: 66 BPM

## 2019-10-04 DIAGNOSIS — Z23 NEED FOR INFLUENZA VACCINATION: ICD-10-CM

## 2019-10-04 DIAGNOSIS — Z12.31 ENCOUNTER FOR SCREENING MAMMOGRAM FOR BREAST CANCER: ICD-10-CM

## 2019-10-04 DIAGNOSIS — Z00.00 ANNUAL PHYSICAL EXAM: Primary | ICD-10-CM

## 2019-10-04 DIAGNOSIS — Z23 NEED FOR TDAP VACCINATION: ICD-10-CM

## 2019-10-04 DIAGNOSIS — Z12.11 COLON CANCER SCREENING: ICD-10-CM

## 2019-10-04 LAB
ALBUMIN SERPL-MCNC: 4.5 G/DL (ref 3.5–5.2)
ALBUMIN/GLOB SERPL: 1.5 G/DL
ALP SERPL-CCNC: 71 U/L (ref 39–117)
ALT SERPL W P-5'-P-CCNC: 19 U/L (ref 1–33)
ANION GAP SERPL CALCULATED.3IONS-SCNC: 15.5 MMOL/L (ref 5–15)
AST SERPL-CCNC: 22 U/L (ref 1–32)
BACTERIA UR QL AUTO: ABNORMAL /HPF
BASOPHILS # BLD AUTO: 0.02 10*3/MM3 (ref 0–0.2)
BASOPHILS NFR BLD AUTO: 0.3 % (ref 0–1.5)
BILIRUB SERPL-MCNC: 0.5 MG/DL (ref 0.2–1.2)
BILIRUB UR QL STRIP: NEGATIVE
BUN BLD-MCNC: 12 MG/DL (ref 6–20)
BUN/CREAT SERPL: 13 (ref 7–25)
CALCIUM SPEC-SCNC: 9.3 MG/DL (ref 8.6–10.5)
CHLORIDE SERPL-SCNC: 99 MMOL/L (ref 98–107)
CHOLEST SERPL-MCNC: 209 MG/DL (ref 0–200)
CLARITY UR: CLEAR
CO2 SERPL-SCNC: 24.5 MMOL/L (ref 22–29)
COLOR UR: YELLOW
CREAT BLD-MCNC: 0.92 MG/DL (ref 0.57–1)
DEPRECATED RDW RBC AUTO: 48.4 FL (ref 37–54)
EOSINOPHIL # BLD AUTO: 0.11 10*3/MM3 (ref 0–0.4)
EOSINOPHIL NFR BLD AUTO: 1.6 % (ref 0.3–6.2)
ERYTHROCYTE [DISTWIDTH] IN BLOOD BY AUTOMATED COUNT: 14.9 % (ref 12.3–15.4)
GFR SERPL CREATININE-BSD FRML MDRD: 62 ML/MIN/1.73
GLOBULIN UR ELPH-MCNC: 3.1 GM/DL
GLUCOSE BLD-MCNC: 90 MG/DL (ref 65–99)
GLUCOSE UR STRIP-MCNC: NEGATIVE MG/DL
HCT VFR BLD AUTO: 44 % (ref 34–46.6)
HDLC SERPL-MCNC: 52 MG/DL (ref 40–60)
HGB BLD-MCNC: 14.2 G/DL (ref 12–15.9)
HGB UR QL STRIP.AUTO: ABNORMAL
HYALINE CASTS UR QL AUTO: ABNORMAL /LPF
KETONES UR QL STRIP: NEGATIVE
LDLC SERPL CALC-MCNC: 138 MG/DL (ref 0–100)
LDLC/HDLC SERPL: 2.65 {RATIO}
LEUKOCYTE ESTERASE UR QL STRIP.AUTO: NEGATIVE
LYMPHOCYTES # BLD AUTO: 2.08 10*3/MM3 (ref 0.7–3.1)
LYMPHOCYTES NFR BLD AUTO: 30.1 % (ref 19.6–45.3)
MCH RBC QN AUTO: 29.2 PG (ref 26.6–33)
MCHC RBC AUTO-ENTMCNC: 32.3 G/DL (ref 31.5–35.7)
MCV RBC AUTO: 90.3 FL (ref 79–97)
MONOCYTES # BLD AUTO: 0.61 10*3/MM3 (ref 0.1–0.9)
MONOCYTES NFR BLD AUTO: 8.8 % (ref 5–12)
NEUTROPHILS # BLD AUTO: 4.09 10*3/MM3 (ref 1.7–7)
NEUTROPHILS NFR BLD AUTO: 59.2 % (ref 42.7–76)
NITRITE UR QL STRIP: NEGATIVE
PH UR STRIP.AUTO: 6 [PH] (ref 5–8)
PLATELET # BLD AUTO: 227 10*3/MM3 (ref 140–450)
PMV BLD AUTO: 11.8 FL (ref 6–12)
POTASSIUM BLD-SCNC: 4.1 MMOL/L (ref 3.5–5.2)
PROT SERPL-MCNC: 7.6 G/DL (ref 6–8.5)
PROT UR QL STRIP: NEGATIVE
RBC # BLD AUTO: 4.87 10*6/MM3 (ref 3.77–5.28)
RBC # UR: ABNORMAL /HPF
REF LAB TEST METHOD: ABNORMAL
SODIUM BLD-SCNC: 139 MMOL/L (ref 136–145)
SP GR UR STRIP: 1.01 (ref 1–1.03)
SQUAMOUS #/AREA URNS HPF: ABNORMAL /HPF
TRIGL SERPL-MCNC: 95 MG/DL (ref 0–150)
TSH SERPL DL<=0.05 MIU/L-ACNC: 1.46 UIU/ML (ref 0.27–4.2)
UROBILINOGEN UR QL STRIP: ABNORMAL
VLDLC SERPL-MCNC: 19 MG/DL (ref 5–40)
WBC NRBC COR # BLD: 6.91 10*3/MM3 (ref 3.4–10.8)
WBC UR QL AUTO: ABNORMAL /HPF

## 2019-10-04 PROCEDURE — 85025 COMPLETE CBC W/AUTO DIFF WBC: CPT | Performed by: NURSE PRACTITIONER

## 2019-10-04 PROCEDURE — 90674 CCIIV4 VAC NO PRSV 0.5 ML IM: CPT | Performed by: NURSE PRACTITIONER

## 2019-10-04 PROCEDURE — 84443 ASSAY THYROID STIM HORMONE: CPT | Performed by: NURSE PRACTITIONER

## 2019-10-04 PROCEDURE — 77067 SCR MAMMO BI INCL CAD: CPT | Performed by: INTERNAL MEDICINE

## 2019-10-04 PROCEDURE — 81001 URINALYSIS AUTO W/SCOPE: CPT | Performed by: NURSE PRACTITIONER

## 2019-10-04 PROCEDURE — 99396 PREV VISIT EST AGE 40-64: CPT | Performed by: NURSE PRACTITIONER

## 2019-10-04 PROCEDURE — 90715 TDAP VACCINE 7 YRS/> IM: CPT | Performed by: NURSE PRACTITIONER

## 2019-10-04 PROCEDURE — 90471 IMMUNIZATION ADMIN: CPT | Performed by: NURSE PRACTITIONER

## 2019-10-04 PROCEDURE — 80053 COMPREHEN METABOLIC PANEL: CPT | Performed by: NURSE PRACTITIONER

## 2019-10-04 PROCEDURE — 36415 COLL VENOUS BLD VENIPUNCTURE: CPT | Performed by: NURSE PRACTITIONER

## 2019-10-04 PROCEDURE — 80061 LIPID PANEL: CPT | Performed by: NURSE PRACTITIONER

## 2019-10-04 PROCEDURE — 77067 SCR MAMMO BI INCL CAD: CPT | Performed by: RADIOLOGY

## 2019-10-04 PROCEDURE — 90472 IMMUNIZATION ADMIN EACH ADD: CPT | Performed by: NURSE PRACTITIONER

## 2019-10-04 NOTE — PROGRESS NOTES
Wen Correa is a 59 y.o. female.     .Well Adult Physical   Patient here for a comprehensive physical exam.The patient reports no problems    Do you take any herbs or supplements that were not prescribed by a doctor?  (multivitamin, calcium/vitamin d   Are you taking calcium supplements? yes  Are you taking aspirin daily? no     History:  LMP: No LMP recorded (lmp unknown). Patient has had a hysterectomy.  Menopause at  N/a years  Last pap date: n/a  Abnormal pap?n/a  : 4  Para: 4      She is a stay at home. She is not exercise routinely. She has been watching her food intake. She is due to for eye exam next month. She is up to date with dental exam.          The following portions of the patient's history were reviewed and updated as appropriate: allergies, current medications, past family history, past medical history, past social history, past surgical history and problem list.    Review of Systems   Constitutional: Negative for appetite change, chills, fatigue and fever.        Overall doing ok    HENT: Negative for congestion, ear pain, hearing loss, mouth sores, nosebleeds, postnasal drip, rhinorrhea, sinus pressure, sneezing, sore throat, tinnitus, trouble swallowing and voice change.    Eyes: Negative for visual disturbance (wears glasses ).   Respiratory: Negative for cough, chest tightness, shortness of breath and wheezing.    Cardiovascular: Positive for leg swelling (intermittent, no changes ). Negative for chest pain and palpitations.   Gastrointestinal: Positive for constipation (intermittent ). Negative for abdominal pain, anal bleeding, blood in stool, diarrhea, nausea and vomiting.        Intermittent reflux    Endocrine: Negative for cold intolerance, heat intolerance, polydipsia, polyphagia and polyuria.   Genitourinary: Negative for dysuria, frequency, hematuria and urgency.   Musculoskeletal: Positive for back pain (chronic low back pain, no changes, has seen  chiropractor ). Negative for arthralgias, gait problem, joint swelling, myalgias, neck pain and neck stiffness.   Skin: Negative for color change and rash.        NEGATIVE BREAST MASS, BREAST PAIN, NIPPLE DISCHARGE, SKIN CHANGES, OR LUMP IN ARMPIT   Allergic/Immunologic: Positive for environmental allergies (seasonal ).   Neurological: Negative for dizziness, tremors, seizures, syncope, speech difficulty, weakness, numbness and headaches.   Hematological: Negative for adenopathy. Does not bruise/bleed easily.   Psychiatric/Behavioral: Negative for behavioral problems, confusion, decreased concentration, sleep disturbance and suicidal ideas. The patient is not nervous/anxious.        Objective   Physical Exam   Constitutional: She appears well-developed and well-nourished.   HENT:   Right Ear: Hearing, tympanic membrane, external ear and ear canal normal.   Left Ear: Hearing, tympanic membrane, external ear and ear canal normal.   Nose: Nose normal. Right sinus exhibits no maxillary sinus tenderness and no frontal sinus tenderness. Left sinus exhibits no maxillary sinus tenderness and no frontal sinus tenderness.   Mouth/Throat: Uvula is midline, oropharynx is clear and moist and mucous membranes are normal. No tonsillar exudate.   No visible lesions    Eyes: Conjunctivae, EOM and lids are normal. Pupils are equal, round, and reactive to light.   Neck: Trachea normal. Neck supple. No JVD present. Carotid bruit is not present. No tracheal deviation present. No thyroid mass and no thyromegaly present.   Cardiovascular: Normal rate, regular rhythm, S1 normal and S2 normal. Exam reveals no gallop and no friction rub.   No murmur heard.  Pulses:       Carotid pulses are 2+ on the right side, and 2+ on the left side.       Radial pulses are 2+ on the right side, and 2+ on the left side.        Dorsalis pedis pulses are 2+ on the right side, and 2+ on the left side.        Posterior tibial pulses are 2+ on the right side,  and 2+ on the left side.   Bilateral varicose veins   No pedal edema    Pulmonary/Chest: Effort normal and breath sounds normal. Chest wall is not dull to percussion.   Abdominal: Soft. Normal aorta and bowel sounds are normal. She exhibits no abdominal bruit. There is no hepatosplenomegaly. There is no tenderness. There is no rebound and no guarding. No hernia.   Musculoskeletal: Normal range of motion. She exhibits no edema.   (-) SLR    Lymphadenopathy:        Head (right side): No submental, no submandibular, no tonsillar, no preauricular, no posterior auricular and no occipital adenopathy present.        Head (left side): No submental, no submandibular, no tonsillar, no preauricular, no posterior auricular and no occipital adenopathy present.     She has no cervical adenopathy.     She has no axillary adenopathy.        Right: No supraclavicular adenopathy present.        Left: No supraclavicular adenopathy present.   Neurological: She is alert. She has normal strength. No cranial nerve deficit or sensory deficit. She displays a negative Romberg sign.   Reflex Scores:       Brachioradialis reflexes are 2+ on the right side and 2+ on the left side.       Patellar reflexes are 2+ on the right side and 2+ on the left side.  Skin: Skin is warm and dry.   Psychiatric: She has a normal mood and affect. Her speech is normal and behavior is normal. Judgment and thought content normal. Cognition and memory are normal.   Nursing note and vitals reviewed.      Assessment/Plan   Cary was seen today for annual exam.    Diagnoses and all orders for this visit:    Annual physical exam  -     Comprehensive Metabolic Panel; Future  -     Lipid Panel; Future  -     Urinalysis With Microscopic If Indicated (No Culture) - Urine, Clean Catch; Future  -     CBC Auto Differential; Future  -     TSH Rfx On Abnormal To Free T4; Future  -     Comprehensive Metabolic Panel  -     Lipid Panel  -     Cancel: Urinalysis With Microscopic  If Indicated (No Culture) - Urine, Clean Catch  -     CBC Auto Differential  -     TSH Rfx On Abnormal To Free T4  -     Urinalysis, Microscopic Only - Urine, Clean Catch; Future  -     Cancel: Urinalysis, Microscopic Only - Urine, Clean Catch  -     Urinalysis With Culture If Indicated - Urine, Clean Catch; Future  -     Urinalysis With Culture If Indicated - Urine, Clean Catch    Need for influenza vaccination  -     Flucelvax Quad (Vial) =>4 yrs ([id number])    Need for Tdap vaccination  -     Tdap Vaccine Greater Than or Equal To 8yo IM    Colon cancer screening  -     Cologuard - Stool, Per Rectum; Future      She will check insurance regarding shingrix.

## 2019-11-06 DIAGNOSIS — Z12.11 COLON CANCER SCREENING: ICD-10-CM

## 2019-11-25 RX ORDER — ESTRADIOL 1 MG/1
TABLET ORAL
Qty: 90 TABLET | Refills: 0 | Status: SHIPPED | OUTPATIENT
Start: 2019-11-25 | End: 2020-02-17

## 2020-02-17 RX ORDER — ESTRADIOL 1 MG/1
TABLET ORAL
Qty: 90 TABLET | Refills: 1 | Status: SHIPPED | OUTPATIENT
Start: 2020-02-17 | End: 2020-09-03

## 2020-09-03 RX ORDER — ESTRADIOL 1 MG/1
TABLET ORAL
Qty: 90 TABLET | Refills: 0 | Status: SHIPPED | OUTPATIENT
Start: 2020-09-03 | End: 2020-11-06

## 2020-09-30 ENCOUNTER — TELEPHONE (OUTPATIENT)
Dept: INTERNAL MEDICINE | Facility: CLINIC | Age: 60
End: 2020-09-30

## 2020-11-06 ENCOUNTER — OFFICE VISIT (OUTPATIENT)
Dept: OBSTETRICS AND GYNECOLOGY | Age: 60
End: 2020-11-06

## 2020-11-06 VITALS
SYSTOLIC BLOOD PRESSURE: 118 MMHG | HEIGHT: 64 IN | DIASTOLIC BLOOD PRESSURE: 80 MMHG | BODY MASS INDEX: 30.39 KG/M2 | WEIGHT: 178 LBS

## 2020-11-06 DIAGNOSIS — N89.8 VAGINAL DISCHARGE: ICD-10-CM

## 2020-11-06 DIAGNOSIS — L81.9 CHANGE IN MULTIPLE PIGMENTED SKIN LESIONS: Primary | ICD-10-CM

## 2020-11-06 DIAGNOSIS — N89.8 VAGINAL ITCHING: ICD-10-CM

## 2020-11-06 PROCEDURE — 99203 OFFICE O/P NEW LOW 30 MIN: CPT | Performed by: NURSE PRACTITIONER

## 2020-11-06 RX ORDER — FLUCONAZOLE 150 MG/1
150 TABLET ORAL
Qty: 2 TABLET | Refills: 0 | Status: SHIPPED | OUTPATIENT
Start: 2020-11-06 | End: 2022-01-21

## 2020-11-06 RX ORDER — HYDROCORTISONE 25 MG/G
CREAM TOPICAL
Qty: 30 G | Refills: 1 | Status: SHIPPED | OUTPATIENT
Start: 2020-11-06 | End: 2021-11-06

## 2020-11-06 RX ORDER — CLOTRIMAZOLE AND BETAMETHASONE DIPROPIONATE 10; .64 MG/G; MG/G
CREAM TOPICAL 2 TIMES DAILY
Qty: 15 G | Refills: 2 | Status: SHIPPED | OUTPATIENT
Start: 2020-11-06 | End: 2022-01-21

## 2020-11-06 RX ORDER — ESTRADIOL 1 MG/1
TABLET ORAL
Qty: 90 TABLET | Refills: 0 | Status: SHIPPED | OUTPATIENT
Start: 2020-11-06 | End: 2021-03-02

## 2020-11-06 NOTE — PROGRESS NOTES
"Wen Correa is a 60 y.o. female is being seen today for   Chief Complaint   Patient presents with   • Establish Care     New pt. last pap 2016 MG 10/2019 c/o an itchy growth on the outside of the LT breast and hemorrhoids acting up. also c/o white vaginal discharge and vaginal itching and irritation. denies any odor   .    History of Present Illness     Previous patient of Dr Bolaños but has not been seen since 2016.  She is here for a few problems today  She has had a hyst  She does mammograms and paps through her PCP office and plans to continue those there    Patient states that she has an itchy area on the outside of her left breast  She noticed a small area that started itching about two weeks ago  It is red and raised  She is unsure if she had a bug bite or anything like that  It has not gotten any worse or better  She has used hydrocortisone cream and it helps temporarily but then comes back  States she does scratch it a lot     She is also having some itching and irritation vaginally for a few weeks  She used OTC yeast medication but it is still itching  It seems somewhat improved but not completely    She is also having a flare of hemorrhoids right now  Her main symptoms is itching and she can feel them  She uses OTC creams for that and it helps some but just not fully  She would like to try a Rx cream  Preparation H seems to irritate her    The following portions of the patient's history were reviewed and updated as appropriate: allergies, current medications, past family history, past medical history, past social history, past surgical history and problem list.    /80   Ht 162.6 cm (64\")   Wt 80.7 kg (178 lb)   LMP  (LMP Unknown)   Breastfeeding No   BMI 30.55 kg/m²         Review of Systems   Constitutional: Negative.    HENT: Negative.    Eyes: Negative.    Respiratory: Negative.    Cardiovascular: Negative.    Gastrointestinal: Negative.         Rectal itching   Hemorrhoids   " "  Endocrine: Negative.    Genitourinary: Positive for vaginal discharge.        Vaginal irritation and itching   Musculoskeletal: Negative.    Skin: Negative.         Raised area on lower left breast that is itchy     Allergic/Immunologic: Negative.    Neurological: Negative.    Hematological: Negative.    Psychiatric/Behavioral: Negative.        Objective   Physical Exam  Constitutional:       Appearance: She is well-developed.   Chest:          Comments: Raised area consistent with the appearance of a mole- slightly red and irritated appearance  No surrounding redness  Also noted is a very dark mole on left side with irregular borders.  She is unsure if it has changed appearance but states it has been there a \"long time\" and her PCP did see it recently as well.  Genitourinary:     Labia:         Right: No lesion.         Left: No lesion.       Vagina: No signs of injury and foreign body. Erythema present. No bleeding.      Comments: Outer vulva is red and irritated, small slits in skin as well  No lesions  Small amount of white, thick discharge noted  Small external hemorrhoids noted, no bleeding noted  Neurological:      Mental Status: She is alert and oriented to person, place, and time.   Psychiatric:         Behavior: Behavior normal.           Assessment/Plan   Diagnoses and all orders for this visit:    1. Change in multiple pigmented skin lesions (Primary)  -     Ambulatory Referral to Dermatology    2. Vaginal itching  -     NuSwab VG+ - Swab, Vagina    3. Vaginal discharge  -     NuSwab VG+ - Swab, Vagina    Other orders  -     fluconazole (Diflucan) 150 MG tablet; Take 1 tablet by mouth Every 3 (Three) Days.  Dispense: 2 tablet; Refill: 0  -     clotrimazole-betamethasone (Lotrisone) 1-0.05 % cream; Apply  topically to the appropriate area as directed 2 (Two) Times a Day.  Dispense: 15 g; Refill: 2  -     Hydrocortisone, Perianal, (Anusol-HC) 2.5 % rectal cream; Apply rectally 2 times daily  Dispense: 30 " g; Refill: 1      Patient referred to dermatology regarding skin lesion on breast and side, she is unsure if she will use the one we refer to or find someone else but she will follow up with them.  Vaginal symptoms are consistent with yeast.  Culture and rx sent.  Will call if s/s do not resolve and rto  Rx sent for hemorrhoids and if symptoms do not improve I suggest she follow up with PCP  She plans to continue her AE, Pap and mammograms at her PCP office

## 2020-11-09 LAB
A VAGINAE DNA VAG QL NAA+PROBE: NORMAL SCORE
BVAB2 DNA VAG QL NAA+PROBE: NORMAL SCORE
C ALBICANS DNA VAG QL NAA+PROBE: NEGATIVE
C GLABRATA DNA VAG QL NAA+PROBE: NEGATIVE
C TRACH DNA VAG QL NAA+PROBE: NEGATIVE
MEGA1 DNA VAG QL NAA+PROBE: NORMAL SCORE
N GONORRHOEA DNA VAG QL NAA+PROBE: NEGATIVE
T VAGINALIS DNA VAG QL NAA+PROBE: NEGATIVE

## 2020-11-12 ENCOUNTER — TELEPHONE (OUTPATIENT)
Dept: OBSTETRICS AND GYNECOLOGY | Age: 60
End: 2020-11-12

## 2020-11-12 NOTE — TELEPHONE ENCOUNTER
----- Message from MEGAN Morgan sent at 11/12/2020  1:44 PM EST -----  Let pt know her swab shows neg for yeast, bv and std's. She was given a topical medicine to use so I would recommend she c/w that treatment

## 2021-03-02 RX ORDER — ESTRADIOL 1 MG/1
TABLET ORAL
Qty: 90 TABLET | Refills: 0 | Status: SHIPPED | OUTPATIENT
Start: 2021-03-02 | End: 2021-06-01

## 2021-03-22 ENCOUNTER — BULK ORDERING (OUTPATIENT)
Dept: CASE MANAGEMENT | Facility: OTHER | Age: 61
End: 2021-03-22

## 2021-03-22 DIAGNOSIS — Z23 IMMUNIZATION DUE: ICD-10-CM

## 2021-04-05 ENCOUNTER — OFFICE VISIT (OUTPATIENT)
Dept: INTERNAL MEDICINE | Facility: CLINIC | Age: 61
End: 2021-04-05

## 2021-04-05 VITALS
OXYGEN SATURATION: 99 % | HEIGHT: 64 IN | BODY MASS INDEX: 30.56 KG/M2 | TEMPERATURE: 95.7 F | DIASTOLIC BLOOD PRESSURE: 80 MMHG | SYSTOLIC BLOOD PRESSURE: 132 MMHG | HEART RATE: 77 BPM | WEIGHT: 179 LBS

## 2021-04-05 DIAGNOSIS — Z00.00 ANNUAL PHYSICAL EXAM: Primary | ICD-10-CM

## 2021-04-05 LAB
ALBUMIN SERPL-MCNC: 4.4 G/DL (ref 3.5–5.2)
ALBUMIN/GLOB SERPL: 1.6 G/DL
ALP SERPL-CCNC: 75 U/L (ref 39–117)
ALT SERPL-CCNC: 17 U/L (ref 1–33)
AST SERPL-CCNC: 21 U/L (ref 1–32)
BACTERIA UR QL AUTO: ABNORMAL /HPF
BILIRUB SERPL-MCNC: 0.4 MG/DL (ref 0–1.2)
BILIRUB UR QL STRIP: NEGATIVE
BUN SERPL-MCNC: 10 MG/DL (ref 8–23)
BUN/CREAT SERPL: 12.5 (ref 7–25)
CALCIUM SERPL-MCNC: 9.6 MG/DL (ref 8.6–10.5)
CHLORIDE SERPL-SCNC: 105 MMOL/L (ref 98–107)
CHOLEST SERPL-MCNC: 188 MG/DL (ref 0–200)
CHOLEST/HDLC SERPL: 3.13 {RATIO}
CLARITY UR: ABNORMAL
CO2 SERPL-SCNC: 25.2 MMOL/L (ref 22–29)
COLOR UR: YELLOW
CREAT SERPL-MCNC: 0.8 MG/DL (ref 0.57–1)
DEPRECATED RDW RBC AUTO: 49.3 FL (ref 37–54)
ERYTHROCYTE [DISTWIDTH] IN BLOOD BY AUTOMATED COUNT: 15.2 % (ref 12.3–15.4)
GLOBULIN SER CALC-MCNC: 2.8 GM/DL
GLUCOSE SERPL-MCNC: 96 MG/DL (ref 65–99)
GLUCOSE UR STRIP-MCNC: NEGATIVE MG/DL
HCT VFR BLD AUTO: 41.7 % (ref 34–46.6)
HDLC SERPL-MCNC: 60 MG/DL (ref 40–60)
HGB BLD-MCNC: 13.8 G/DL (ref 12–15.9)
HGB UR QL STRIP.AUTO: ABNORMAL
HYALINE CASTS UR QL AUTO: ABNORMAL /LPF
KETONES UR QL STRIP: NEGATIVE
LDLC SERPL CALC-MCNC: 115 MG/DL (ref 0–100)
LEUKOCYTE ESTERASE UR QL STRIP.AUTO: ABNORMAL
MCH RBC QN AUTO: 29.6 PG (ref 26.6–33)
MCHC RBC AUTO-ENTMCNC: 33.1 G/DL (ref 31.5–35.7)
MCV RBC AUTO: 89.5 FL (ref 79–97)
MUCOUS THREADS URNS QL MICRO: ABNORMAL /HPF
NITRITE UR QL STRIP: NEGATIVE
PH UR STRIP.AUTO: 6 [PH] (ref 5–8)
PLATELET # BLD AUTO: 255 10*3/MM3 (ref 140–450)
PMV BLD AUTO: 12 FL (ref 6–12)
POTASSIUM SERPL-SCNC: 4.4 MMOL/L (ref 3.5–5.2)
PROT SERPL-MCNC: 7.2 G/DL (ref 6–8.5)
PROT UR QL STRIP: NEGATIVE
RBC # BLD AUTO: 4.66 10*6/MM3 (ref 3.77–5.28)
RBC # UR: ABNORMAL /HPF
REF LAB TEST METHOD: ABNORMAL
SODIUM SERPL-SCNC: 143 MMOL/L (ref 136–145)
SP GR UR STRIP: 1.02 (ref 1–1.03)
SQUAMOUS #/AREA URNS HPF: ABNORMAL /HPF
TRIGL SERPL-MCNC: 71 MG/DL (ref 0–150)
TSH SERPL DL<=0.005 MIU/L-ACNC: 1.26 UIU/ML (ref 0.27–4.2)
UROBILINOGEN UR QL STRIP: ABNORMAL
VLDLC SERPL CALC-MCNC: 13 MG/DL (ref 5–40)
WBC # BLD AUTO: 5.59 10*3/MM3 (ref 3.4–10.8)
WBC UR QL AUTO: ABNORMAL /HPF

## 2021-04-05 PROCEDURE — 81001 URINALYSIS AUTO W/SCOPE: CPT | Performed by: INTERNAL MEDICINE

## 2021-04-05 PROCEDURE — 99386 PREV VISIT NEW AGE 40-64: CPT | Performed by: INTERNAL MEDICINE

## 2021-04-05 PROCEDURE — 85027 COMPLETE CBC AUTOMATED: CPT | Performed by: INTERNAL MEDICINE

## 2021-04-05 PROCEDURE — 36415 COLL VENOUS BLD VENIPUNCTURE: CPT | Performed by: INTERNAL MEDICINE

## 2021-04-05 NOTE — PROGRESS NOTES
Chief Complaint   Patient presents with   • Annual Exam       Subjective   Cary Correa is a 61 y.o. female.     History of Present Illness     She is here for annual examination.    She generally feels healthy.    Her appetite is good.    She tries to follow a balanced diet.    She sleeps well.    Her energy is fair.   She exercises intermittently.   She is up to date on dental and eye exams.   She is finding it difficult to loose weight.          The following portions of the patient's history were reviewed and updated as appropriate: allergies, current medications, past family history, past medical history, past social history, past surgical history and problem list.    Review of Systems   Constitutional: Positive for fatigue. Negative for appetite change and fever.   HENT: Positive for postnasal drip. Negative for nosebleeds, sinus pressure and sore throat.    Eyes: Negative for blurred vision and double vision.   Respiratory: Negative for cough and shortness of breath.    Cardiovascular: Negative for chest pain, palpitations and leg swelling.   Gastrointestinal: Positive for constipation (sometimes). Negative for abdominal pain, diarrhea, nausea and vomiting.   Endocrine: Negative for cold intolerance, heat intolerance, polydipsia and polyuria.   Genitourinary: Negative for breast discharge, breast lump, dysuria and frequency.   Musculoskeletal: Positive for arthralgias (knees). Negative for back pain.   Skin: Negative for rash.   Allergic/Immunologic: Positive for environmental allergies.   Neurological: Negative for dizziness and headache.   Hematological: Negative for adenopathy. Does not bruise/bleed easily.   Psychiatric/Behavioral: Negative for sleep disturbance and depressed mood. The patient is not nervous/anxious.            Current Outpatient Medications:   •  acetaminophen (TYLENOL) 325 MG tablet, Take 650 mg by mouth Every 4 (Four) Hours As Needed for Mild Pain (1-3)., Disp: , Rfl:   •   "clotrimazole-betamethasone (Lotrisone) 1-0.05 % cream, Apply  topically to the appropriate area as directed 2 (Two) Times a Day., Disp: 15 g, Rfl: 2  •  estradiol (ESTRACE) 1 MG tablet, TAKE 1 TABLET BY MOUTH EVERY DAY, Disp: 90 tablet, Rfl: 0  •  fluconazole (Diflucan) 150 MG tablet, Take 1 tablet by mouth Every 3 (Three) Days., Disp: 2 tablet, Rfl: 0  •  Hydrocortisone, Perianal, (Anusol-HC) 2.5 % rectal cream, Apply rectally 2 times daily, Disp: 30 g, Rfl: 1  •  Sodium Fluoride 5000 Sensitive 1.1-5 % paste, USE AS DAILY TOOTH PASTE, Disp: , Rfl:         Objective     /80   Pulse 77   Temp 95.7 °F (35.4 °C)   Ht 162.6 cm (64\")   Wt 81.2 kg (179 lb)   LMP  (LMP Unknown)   SpO2 99%   BMI 30.73 kg/m²     Physical Exam  Vitals and nursing note reviewed.   Constitutional:       General: She is not in acute distress.     Appearance: Normal appearance. She is well-developed.   HENT:      Right Ear: Tympanic membrane and ear canal normal.      Left Ear: Tympanic membrane and ear canal normal.      Nose:      Right Sinus: No maxillary sinus tenderness or frontal sinus tenderness.      Left Sinus: No maxillary sinus tenderness or frontal sinus tenderness.   Eyes:      General: No scleral icterus.     Conjunctiva/sclera: Conjunctivae normal.      Pupils: Pupils are equal, round, and reactive to light.   Neck:      Thyroid: No thyromegaly.      Vascular: Normal carotid pulses. No carotid bruit.      Trachea: No tracheal deviation.   Cardiovascular:      Rate and Rhythm: Regular rhythm.      Pulses:           Carotid pulses are 2+ on the right side and 2+ on the left side.     Heart sounds: S1 normal and S2 normal. No murmur heard.   No friction rub. No gallop.    Pulmonary:      Effort: Pulmonary effort is normal.      Breath sounds: Normal breath sounds. No wheezing, rhonchi or rales.   Chest:      Breasts:         Right: Normal.         Left: Normal.   Abdominal:      General: Bowel sounds are normal. There is " no abdominal bruit.      Palpations: Abdomen is soft.      Tenderness: There is no abdominal tenderness. There is no guarding or rebound.   Musculoskeletal:      Cervical back: Normal range of motion and neck supple.   Lymphadenopathy:      Cervical: No cervical adenopathy.   Skin:     General: Skin is warm and dry.   Neurological:      Mental Status: She is alert and oriented to person, place, and time.      Cranial Nerves: No cranial nerve deficit.      Coordination: Coordination normal.           Assessment/Plan   Diagnoses and all orders for this visit:    1. Annual physical exam (Primary)  -     Comprehensive Metabolic Panel  -     Lipid Panel With / Chol / HDL Ratio  -     CBC (No Diff)  -     TSH Rfx On Abnormal To Free T4  -     Urinalysis With Microscopic If Indicated (No Culture) - Urine, Clean Catch      Encouraged prudent diet, regular exercise, maintenance of healthy weight, good sleep habits,  avoidance of tobacco products, excessive alcohol. In regard to COVID pandemic, encouraged social distancing, wearing a mask while out, frequent handwashing.

## 2021-05-29 DIAGNOSIS — N89.8 VAGINAL SCAR: Primary | ICD-10-CM

## 2021-06-01 RX ORDER — ESTRADIOL 1 MG/1
TABLET ORAL
Qty: 90 TABLET | Refills: 1 | Status: SHIPPED | OUTPATIENT
Start: 2021-06-01 | End: 2021-12-06

## 2021-06-03 DIAGNOSIS — Z12.31 ENCOUNTER FOR SCREENING MAMMOGRAM FOR BREAST CANCER: Primary | ICD-10-CM

## 2021-06-04 ENCOUNTER — APPOINTMENT (OUTPATIENT)
Dept: WOMENS IMAGING | Facility: HOSPITAL | Age: 61
End: 2021-06-04

## 2021-06-04 ENCOUNTER — OFFICE VISIT (OUTPATIENT)
Dept: INTERNAL MEDICINE | Facility: CLINIC | Age: 61
End: 2021-06-04

## 2021-06-04 DIAGNOSIS — Z12.31 ENCOUNTER FOR SCREENING MAMMOGRAM FOR BREAST CANCER: ICD-10-CM

## 2021-06-04 PROCEDURE — 77067 SCR MAMMO BI INCL CAD: CPT | Performed by: RADIOLOGY

## 2021-06-04 PROCEDURE — 77067 SCR MAMMO BI INCL CAD: CPT | Performed by: INTERNAL MEDICINE

## 2021-06-14 ENCOUNTER — DOCUMENTATION (OUTPATIENT)
Dept: INTERNAL MEDICINE | Facility: CLINIC | Age: 61
End: 2021-06-14

## 2021-06-14 DIAGNOSIS — N63.0 BREAST MASS: ICD-10-CM

## 2021-06-14 DIAGNOSIS — N64.89 BREAST ASYMMETRY: Primary | ICD-10-CM

## 2021-06-14 NOTE — PROGRESS NOTES
I  spoke with this pt via telephone regarding her abnormal screening mammogram. A result letter was also mailed to her. I provided to her the instructions for scheduling the diagnostic imaging at [facility]. I have also put order in chart and sent to Provider for cosign.    Thanks-Subha

## 2021-07-14 ENCOUNTER — APPOINTMENT (OUTPATIENT)
Dept: WOMENS IMAGING | Facility: HOSPITAL | Age: 61
End: 2021-07-14

## 2021-07-14 PROCEDURE — 76641 ULTRASOUND BREAST COMPLETE: CPT | Performed by: RADIOLOGY

## 2021-07-14 PROCEDURE — 77061 BREAST TOMOSYNTHESIS UNI: CPT | Performed by: RADIOLOGY

## 2021-07-14 PROCEDURE — G0279 TOMOSYNTHESIS, MAMMO: HCPCS | Performed by: RADIOLOGY

## 2021-07-14 PROCEDURE — 77065 DX MAMMO INCL CAD UNI: CPT | Performed by: RADIOLOGY

## 2021-07-20 DIAGNOSIS — N60.01 BREAST CYST, RIGHT: Primary | ICD-10-CM

## 2021-07-30 ENCOUNTER — APPOINTMENT (OUTPATIENT)
Dept: WOMENS IMAGING | Facility: HOSPITAL | Age: 61
End: 2021-07-30

## 2021-07-30 PROCEDURE — 19000 PUNCTURE ASPIR CYST BREAST: CPT | Performed by: RADIOLOGY

## 2021-07-30 PROCEDURE — 76942 ECHO GUIDE FOR BIOPSY: CPT | Performed by: RADIOLOGY

## 2021-08-26 ENCOUNTER — PATIENT MESSAGE (OUTPATIENT)
Dept: INTERNAL MEDICINE | Facility: CLINIC | Age: 61
End: 2021-08-26

## 2021-08-26 NOTE — TELEPHONE ENCOUNTER
From: Cary Correa  To: Pretty Perales MD  Sent: 8/26/2021 1:16 PM EDT  Subject: Non-Urgent Medical Question    Dear Dr Perales   On July 30th I had a cyst aspiration of the right breast. l read the results report, but is not clear to me   if the results are positive or negative. I'm concerned and preoccupied, and I would appreciate if you let me know if they found anything concerning or suspicious (malignant) upon examination of the aspirate. It was recommended a six month x-ray follow up, wich I already made an appointment.  Thank you  Cary Correa

## 2021-12-04 DIAGNOSIS — N89.8 VAGINAL SCAR: ICD-10-CM

## 2021-12-06 RX ORDER — ESTRADIOL 1 MG/1
TABLET ORAL
Qty: 90 TABLET | Refills: 1 | Status: SHIPPED | OUTPATIENT
Start: 2021-12-06

## 2022-01-17 ENCOUNTER — APPOINTMENT (OUTPATIENT)
Dept: WOMENS IMAGING | Facility: HOSPITAL | Age: 62
End: 2022-01-17

## 2022-01-17 PROCEDURE — 77065 DX MAMMO INCL CAD UNI: CPT | Performed by: RADIOLOGY

## 2022-01-17 PROCEDURE — 77061 BREAST TOMOSYNTHESIS UNI: CPT | Performed by: RADIOLOGY

## 2022-01-17 PROCEDURE — G0279 TOMOSYNTHESIS, MAMMO: HCPCS | Performed by: RADIOLOGY

## 2022-01-21 ENCOUNTER — TELEPHONE (OUTPATIENT)
Dept: FAMILY MEDICINE CLINIC | Facility: CLINIC | Age: 62
End: 2022-01-21

## 2022-01-21 ENCOUNTER — OFFICE VISIT (OUTPATIENT)
Dept: FAMILY MEDICINE CLINIC | Facility: CLINIC | Age: 62
End: 2022-01-21

## 2022-01-21 VITALS
DIASTOLIC BLOOD PRESSURE: 80 MMHG | WEIGHT: 182 LBS | TEMPERATURE: 97.3 F | OXYGEN SATURATION: 99 % | BODY MASS INDEX: 31.07 KG/M2 | HEIGHT: 64 IN | SYSTOLIC BLOOD PRESSURE: 132 MMHG | HEART RATE: 67 BPM

## 2022-01-21 DIAGNOSIS — E78.00 HIGH CHOLESTEROL: ICD-10-CM

## 2022-01-21 DIAGNOSIS — R92.8 ABNORMAL MAMMOGRAM: Primary | ICD-10-CM

## 2022-01-21 PROCEDURE — 99213 OFFICE O/P EST LOW 20 MIN: CPT | Performed by: FAMILY MEDICINE

## 2022-01-21 NOTE — TELEPHONE ENCOUNTER
Left message to return call.  Cancel routine mammo in 6/2022--no need  Needs a diagnostic mammo in 7/2022

## 2022-01-21 NOTE — PROGRESS NOTES
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 No fxhx of Breat cancer, no chest pain or shortness of breath, non-smoker, patient has a history of mildly elevated cholesterol elevation, not fasting, patient had a history of a total hysterectomy due to uterine prolapse years ago,  Objective   Vital Signs:   /80   Pulse 67   Temp 97.3 °F (36.3 °C) (Infrared)   Ht 162.6 cm (64\")   Wt 82.6 kg (182 lb)   SpO2 99%   BMI 31.24 kg/m²     Physical Exam  Vitals and nursing note reviewed.   Constitutional:       Appearance: She is well-developed.   HENT:      Head: Normocephalic and atraumatic.      Right Ear: External ear normal.      Left Ear: External ear normal.      Nose: Nose normal.   Eyes:      General: No scleral icterus.        Right eye: No discharge.         Left eye: No discharge.      Conjunctiva/sclera: Conjunctivae normal.      Pupils: Pupils are equal, round, and reactive to light.   Neck:      Thyroid: No thyromegaly.      Vascular: No JVD.      Trachea: No tracheal deviation.   Cardiovascular:      Rate and Rhythm: Normal rate and regular rhythm.      Heart sounds: Normal heart sounds. No murmur heard.  No friction rub. No gallop.    Pulmonary:      Effort: Pulmonary effort is normal. No respiratory distress.      Breath sounds: Normal breath sounds. No wheezing or rales.   Chest:      Chest wall: No tenderness.   Abdominal:      General: Bowel sounds are normal. There is no distension.      Palpations: Abdomen is soft. There is no mass.      Tenderness: There is no abdominal tenderness. There is no guarding or rebound.      Hernia: No hernia is present.   Musculoskeletal:         General: No tenderness. Normal range of motion.      Cervical back: Normal range of motion and neck supple. "   Lymphadenopathy:      Cervical: No cervical adenopathy.   Skin:     General: Skin is warm and dry.   Neurological:      Mental Status: She is alert.      Cranial Nerves: No cranial nerve deficit.      Sensory: No sensory deficit.      Motor: No abnormal muscle tone.      Coordination: Coordination normal.      Deep Tendon Reflexes: Reflexes normal.   Psychiatric:         Behavior: Behavior normal.         Thought Content: Thought content normal.         Judgment: Judgment normal.        Result Review :                 Assessment and Plan    Diagnoses and all orders for this visit:    1. Abnormal mammogram (Primary)  -     Ambulatory Referral to Gynecology  -     Mammo Diagnostic Digital Tomosynthesis Bilateral With CAD; Future    2. High cholesterol  -     Comprehensive Metabolic Panel; Future  -     CBC & Differential; Future  -     Lipid Panel; Future  -     POC Urinalysis Dipstick, Automated; Future        Follow Up   Return in about 6 months (around 7/21/2022).  Patient was given instructions and counseling regarding her condition or for health maintenance advice. Please see specific information pulled into the AVS if appropriate.

## 2022-01-24 ENCOUNTER — TELEPHONE (OUTPATIENT)
Dept: FAMILY MEDICINE CLINIC | Facility: CLINIC | Age: 62
End: 2022-01-24

## 2022-01-24 NOTE — TELEPHONE ENCOUNTER
Pt calling back regarding mammo.    -Pt states she was going to have routine mammo in June to do at the same time as physical.    -Pt would like call back for clarification    Can be reached at [phone]

## 2022-07-18 ENCOUNTER — APPOINTMENT (OUTPATIENT)
Dept: WOMENS IMAGING | Facility: HOSPITAL | Age: 62
End: 2022-07-18

## 2022-07-18 PROCEDURE — 77062 BREAST TOMOSYNTHESIS BI: CPT | Performed by: RADIOLOGY

## 2022-07-18 PROCEDURE — 77066 DX MAMMO INCL CAD BI: CPT | Performed by: RADIOLOGY

## 2022-07-18 PROCEDURE — G0279 TOMOSYNTHESIS, MAMMO: HCPCS | Performed by: RADIOLOGY

## 2022-07-19 DIAGNOSIS — R92.8 ABNORMAL MAMMOGRAM OF RIGHT BREAST: Primary | ICD-10-CM

## 2022-07-19 DIAGNOSIS — R92.8 ABNORMAL MAMMOGRAM: ICD-10-CM

## 2023-01-20 ENCOUNTER — APPOINTMENT (OUTPATIENT)
Dept: WOMENS IMAGING | Facility: HOSPITAL | Age: 63
End: 2023-01-20
Payer: COMMERCIAL

## 2023-01-20 PROCEDURE — G0279 TOMOSYNTHESIS, MAMMO: HCPCS | Performed by: RADIOLOGY

## 2023-01-20 PROCEDURE — 77065 DX MAMMO INCL CAD UNI: CPT | Performed by: RADIOLOGY

## 2023-01-20 PROCEDURE — 77061 BREAST TOMOSYNTHESIS UNI: CPT | Performed by: RADIOLOGY

## 2023-01-25 ENCOUNTER — PATIENT MESSAGE (OUTPATIENT)
Dept: FAMILY MEDICINE CLINIC | Facility: CLINIC | Age: 63
End: 2023-01-25
Payer: COMMERCIAL

## 2023-01-25 DIAGNOSIS — Z12.31 BREAST CANCER SCREENING BY MAMMOGRAM: ICD-10-CM

## 2023-01-25 DIAGNOSIS — R92.8 ABNORMAL MAMMOGRAM: Primary | ICD-10-CM

## 2023-05-22 ENCOUNTER — TRANSCRIBE ORDERS (OUTPATIENT)
Dept: FAMILY MEDICINE CLINIC | Facility: CLINIC | Age: 63
End: 2023-05-22
Payer: COMMERCIAL

## 2023-05-22 DIAGNOSIS — Z12.31 SCREENING MAMMOGRAM, ENCOUNTER FOR: Primary | ICD-10-CM

## 2023-07-26 ENCOUNTER — APPOINTMENT (OUTPATIENT)
Dept: WOMENS IMAGING | Facility: HOSPITAL | Age: 63
End: 2023-07-26
Payer: COMMERCIAL

## 2023-07-26 PROCEDURE — G0279 TOMOSYNTHESIS, MAMMO: HCPCS | Performed by: RADIOLOGY

## 2023-07-26 PROCEDURE — 77066 DX MAMMO INCL CAD BI: CPT | Performed by: RADIOLOGY

## 2023-07-26 PROCEDURE — 76642 ULTRASOUND BREAST LIMITED: CPT | Performed by: RADIOLOGY

## 2023-07-26 PROCEDURE — 77062 BREAST TOMOSYNTHESIS BI: CPT | Performed by: RADIOLOGY

## 2023-09-01 ENCOUNTER — OFFICE VISIT (OUTPATIENT)
Dept: INTERNAL MEDICINE | Facility: CLINIC | Age: 63
End: 2023-09-01
Payer: COMMERCIAL

## 2023-09-01 VITALS
WEIGHT: 185.5 LBS | BODY MASS INDEX: 31.67 KG/M2 | DIASTOLIC BLOOD PRESSURE: 80 MMHG | HEART RATE: 62 BPM | SYSTOLIC BLOOD PRESSURE: 132 MMHG | HEIGHT: 64 IN | OXYGEN SATURATION: 99 %

## 2023-09-01 DIAGNOSIS — Z00.00 ENCOUNTER FOR MEDICAL EXAMINATION TO ESTABLISH CARE: ICD-10-CM

## 2023-09-01 DIAGNOSIS — E78.00 HIGH CHOLESTEROL: Primary | ICD-10-CM

## 2023-09-01 PROCEDURE — 99213 OFFICE O/P EST LOW 20 MIN: CPT

## 2023-09-01 RX ORDER — HYDROCORTISONE 25 MG/G
CREAM TOPICAL
COMMUNITY
Start: 2023-08-15

## 2023-09-01 NOTE — PATIENT INSTRUCTIONS
Continue medications as prescribed. Increase physical activity as tolerated. Stay hydrated with water. Keep orthopedic appointment. Follow-up in 9 months for annual exam and fasting labs.    Spoke with daughter Maida. States that patient was instructed to hold both warfarin and Lovenox. States they want urine clear for 3 days after catheter is removed next week.  Her father expressed concern for being off anticoagulants for a long period of time.  Please advise.

## 2023-09-01 NOTE — PROGRESS NOTES
"Chief Complaint  No chief complaint on file.    Wen Correa presents to [facility] PRIMARY CARE  History of Present Illness  63-year-old female presenting to establish care.  Is not employed takes care of her home.  Daughter is also seen by provider.  Has a cyst in her right breast that its been stable.  Most recently checked in July.  Now able to monitor every year.  Recently had cholesterol levels checked.  , HDL 61, triglycerides 98.  Diet includes a light breakfast including toast and coffee, light lunch including small plate of leftovers and typically cooks at dinner in the evening.  Drinks water and sweet tea throughout the day.  Hemoglobin A1c last checked was 5.6.    Has recently noticed some slight \"digging\" in socks throughout the day.    Unsure of family history.  Routinely sees dentist and optometrist.  Previous history of bladder prolapse and 2017 or 2018.    Sees Dr. Oz Murry for orthopedics.  Both knees are issues.  States the right is worse than the left.  Occasionally they swell and prevent her from kneeling.     Objective   Vital Signs:  /80 (BP Location: Left arm, Patient Position: Sitting, Cuff Size: Adult)   Pulse 62   Ht 162.6 cm (64\")   Wt 84.1 kg (185 lb 8 oz)   SpO2 99%   BMI 31.84 kg/m²   Estimated body mass index is 31.84 kg/m² as calculated from the following:    Height as of this encounter: 162.6 cm (64\").    Weight as of this encounter: 84.1 kg (185 lb 8 oz).       BMI is >= 30 and <35. (Class 1 Obesity). The following options were offered after discussion;: exercise counseling/recommendations and nutrition counseling/recommendations      Physical Exam  Vitals reviewed.   Constitutional:       Appearance: Normal appearance.   HENT:      Head: Normocephalic.   Musculoskeletal:         General: Normal range of motion.      Cervical back: Normal range of motion.   Skin:     General: Skin is warm and dry.      Capillary Refill: " Capillary refill takes less than 2 seconds.   Neurological:      General: No focal deficit present.      Mental Status: She is alert and oriented to person, place, and time.   Psychiatric:         Mood and Affect: Mood normal.         Behavior: Behavior normal.         Thought Content: Thought content normal.         Judgment: Judgment normal.      Result Review :      Current Outpatient Medications on File Prior to Visit   Medication Sig Dispense Refill    acetaminophen (TYLENOL) 325 MG tablet Take 2 tablets by mouth Every 4 (Four) Hours As Needed for Mild Pain.      estradiol (ESTRACE) 1 MG tablet TAKE 1 TABLET BY MOUTH EVERY DAY 90 tablet 1    Hydrocortisone, Perianal, (ANUSOL-HC) 2.5 % rectal cream 1 (ONE) APPLICATION AS NEEDED FOR HEMORROIDS       No current facility-administered medications on file prior to visit.                    Assessment and Plan   Diagnoses and all orders for this visit:    1. High cholesterol (Primary)    2. Encounter for medical examination to establish care      Patient Instructions   Continue medications as prescribed. Increase physical activity as tolerated. Stay hydrated with water. Keep orthopedic appointment. Follow-up in 9 months for annual exam and fasting labs.          Follow Up   Return in about 9 months (around 6/1/2024) for Annual physical.  Patient was given instructions and counseling regarding her condition or for health maintenance advice. Please see specific information pulled into the AVS if appropriate.

## 2023-10-23 ENCOUNTER — HOSPITAL ENCOUNTER (OUTPATIENT)
Dept: CARDIOLOGY | Facility: HOSPITAL | Age: 63
Discharge: HOME | End: 2023-10-23
Admitting: OTOLARYNGOLOGY
Payer: COMMERCIAL

## 2023-10-23 ENCOUNTER — TRANSCRIBE ORDERS (OUTPATIENT)
Dept: CARDIOLOGY | Facility: HOSPITAL | Age: 63
End: 2023-10-23
Payer: COMMERCIAL

## 2023-10-23 DIAGNOSIS — D14.0 BENIGN NEOPLASM OF CARTILAGE OF NOSE: Primary | ICD-10-CM

## 2023-10-23 LAB
QT INTERVAL: 408 MS
QTC INTERVAL: 415 MS

## 2023-10-23 PROCEDURE — 93005 ELECTROCARDIOGRAM TRACING: CPT

## 2024-07-25 ENCOUNTER — OFFICE VISIT (OUTPATIENT)
Dept: INTERNAL MEDICINE | Facility: CLINIC | Age: 64
End: 2024-07-25
Payer: COMMERCIAL

## 2024-07-25 VITALS
BODY MASS INDEX: 32.3 KG/M2 | TEMPERATURE: 98.6 F | SYSTOLIC BLOOD PRESSURE: 146 MMHG | WEIGHT: 189.2 LBS | HEIGHT: 64 IN | HEART RATE: 79 BPM | OXYGEN SATURATION: 98 % | DIASTOLIC BLOOD PRESSURE: 94 MMHG

## 2024-07-25 DIAGNOSIS — M62.838 MUSCLE SPASM: Primary | ICD-10-CM

## 2024-07-25 PROCEDURE — 99213 OFFICE O/P EST LOW 20 MIN: CPT

## 2024-07-25 RX ORDER — NAPROXEN 500 MG/1
1 TABLET ORAL EVERY 12 HOURS SCHEDULED
COMMUNITY
Start: 2024-07-10 | End: 2024-07-25

## 2024-07-25 RX ORDER — LIDOCAINE 50 MG/G
PATCH TOPICAL
COMMUNITY
Start: 2024-07-10

## 2024-07-25 RX ORDER — TIZANIDINE HYDROCHLORIDE 2 MG/1
2 CAPSULE, GELATIN COATED ORAL 3 TIMES DAILY PRN
Qty: 30 CAPSULE | Refills: 0 | Status: SHIPPED | OUTPATIENT
Start: 2024-07-25

## 2024-07-25 NOTE — PROGRESS NOTES
"Chief Complaint  Pain (Left side radiating to front, starting 7/10)    Subjective        Cary Correa presents to [facility] PRIMARY [facility]  History of Present Illness  64-year-old female presenting with left-sided back pain.  Traveled to [facility] on 7/9.  States that she felt sharp pain when lifting luggage.  The next day had significant pain and went to local ER.  Was prescribed cyclobenzaprine, lidocaine patch and naproxen.  States that the cyclobenzaprine did not help much.  Has run out of medications and is taking Tylenol.  Pain is still present but improved.  Denies numbness or tingling down the legs.  Denies changes in range of motion.  Denies dysuria, fever, abdominal pain.  Pain        Objective   Vital Signs:  /94 (BP Location: Right arm, Patient Position: Sitting, Cuff Size: Large Adult)   Pulse 79   Temp 98.6 °F (37 °C)   Ht 162.6 cm (64\")   Wt 85.8 kg (189 lb 3.2 oz)   SpO2 98%   BMI 32.48 kg/m²   Estimated body mass index is 32.48 kg/m² as calculated from the following:    Height as of this encounter: 162.6 cm (64\").    Weight as of this encounter: 85.8 kg (189 lb 3.2 oz).               Physical Exam  Vitals reviewed.   Constitutional:       Appearance: Normal appearance.   HENT:      Head: Normocephalic.   Musculoskeletal:         General: Tenderness present. Normal range of motion.      Cervical back: Normal range of motion.   Skin:     General: Skin is warm and dry.      Capillary Refill: Capillary refill takes less than 2 seconds.   Neurological:      General: No focal deficit present.      Mental Status: She is alert and oriented to person, place, and time.   Psychiatric:         Mood and Affect: Mood normal.         Behavior: Behavior normal.         Thought Content: Thought content normal.         Judgment: Judgment normal.        Result Review :            Current Outpatient Medications on File Prior to Visit   Medication Sig Dispense Refill    acetaminophen " (TYLENOL) 325 MG tablet Take 2 tablets by mouth Every 4 (Four) Hours As Needed for Mild Pain.      estradiol (ESTRACE) 1 MG tablet TAKE 1 TABLET BY MOUTH EVERY DAY 90 tablet 1    Hydrocortisone, Perianal, (ANUSOL-HC) 2.5 % rectal cream 1 (ONE) APPLICATION AS NEEDED FOR HEMORROIDS      lidocaine (LIDODERM) 5 % APPLY TO INTACT SKIN FOR UP TO 12 HOURS WITHIN A 24 HOUR PERIOD      [DISCONTINUED] naproxen (NAPROSYN) 500 MG tablet Take 1 tablet by mouth Every 12 (Twelve) Hours. (Patient not taking: Reported on 7/25/2024)       No current facility-administered medications on file prior to visit.                Assessment and Plan     Diagnoses and all orders for this visit:    1. Muscle spasm (Primary)  -     TiZANidine (ZANAFLEX) 2 MG capsule; Take 1 capsule by mouth 3 (Three) Times a Day As Needed for Muscle Spasms.  Dispense: 30 capsule; Refill: 0        Patient Instructions   Recommend Tylenol for pain. May take tizanidine 2 mg three times a day as needed for pain. Apply topical analgesics as needed. Recommend heat to the area. Gentle stretches throughout the day. Walk. Avoid overexertion. Stay hydrated with water. Follow-up as needed.           Follow Up     No follow-ups on file.  Patient was given instructions and counseling regarding her condition or for health maintenance advice. Please see specific information pulled into the AVS if appropriate.

## 2024-07-25 NOTE — PATIENT INSTRUCTIONS
Recommend Tylenol for pain. May take tizanidine 2 mg three times a day as needed for pain. Apply topical analgesics as needed. Recommend heat to the area. Gentle stretches throughout the day. Walk. Avoid overexertion. Stay hydrated with water. Follow-up as needed.

## 2024-08-01 ENCOUNTER — TRANSCRIBE ORDERS (OUTPATIENT)
Dept: ADMINISTRATIVE | Facility: HOSPITAL | Age: 64
End: 2024-08-01
Payer: COMMERCIAL

## 2024-08-01 DIAGNOSIS — Z12.31 SCREENING MAMMOGRAM FOR BREAST CANCER: Primary | ICD-10-CM

## 2024-09-12 ENCOUNTER — OFFICE VISIT (OUTPATIENT)
Dept: INTERNAL MEDICINE | Facility: CLINIC | Age: 64
End: 2024-09-12
Payer: COMMERCIAL

## 2024-09-12 VITALS
HEART RATE: 70 BPM | OXYGEN SATURATION: 97 % | DIASTOLIC BLOOD PRESSURE: 82 MMHG | SYSTOLIC BLOOD PRESSURE: 116 MMHG | BODY MASS INDEX: 32.44 KG/M2 | WEIGHT: 189 LBS

## 2024-09-12 DIAGNOSIS — R22.2 ABDOMINAL WALL LUMP: ICD-10-CM

## 2024-09-12 DIAGNOSIS — R10.32 LEFT LOWER QUADRANT ABDOMINAL PAIN: Primary | ICD-10-CM

## 2024-09-12 PROCEDURE — 99213 OFFICE O/P EST LOW 20 MIN: CPT

## 2024-09-12 NOTE — PATIENT INSTRUCTIONS
Continue over-the-counter pain reliever. Recommend limiting twisting and pulling motions. US ordered. Scheduling center to call with appointment. Follow-up as scheduled.

## 2024-09-12 NOTE — PROGRESS NOTES
"Chief Complaint  Abdominal Pain    Subjective        Cary Correa presents to [facility] PRIMARY [facility]ARE  History of Present Illness  64-year-old female presenting with abdominal pain.  Abdominal pain from July has not resolved.  States that it has improved but is also noticed a band in her abdomen from bellybutton to her flank that bulges.  Has not noticed any discoloration.  States the pain is worse when making beds, sweeping or lifting things.  Pain lessens when lying down and the bulge tends to lessen when lying down.  Denies any new trauma or injury.  Has been treating with ibuprofen, naproxen and Tylenol.  Pain is tolerable.  Denies constipation, diarrhea, nausea, vomiting, sharp pain.  Abdominal Pain  This is a recurrent problem. The current episode started more than 1 month ago. The onset quality is gradual. The problem occurs constantly. The most recent episode lasted 3 months. The pain is located in the LLQ. The pain is at a severity of 7/10. The quality of the pain is aching, cramping, dull and a sensation of fullness. The abdominal pain radiates to the LLQ, back and left flank. Associated symptoms include belching, flatus and myalgias. The pain is aggravated by certain positions, eating, movement and palpation. The pain is relieved by Certain positions, palpation and passing flatus.       Objective   Vital Signs:  /82 (BP Location: Right arm, Patient Position: Sitting, Cuff Size: Adult)   Pulse 70   Wt 85.7 kg (189 lb)   SpO2 97%   BMI 32.44 kg/m²   Estimated body mass index is 32.44 kg/m² as calculated from the following:    Height as of 7/25/24: 162.6 cm (64\").    Weight as of this encounter: 85.7 kg (189 lb).             Physical Exam  Vitals reviewed.   Constitutional:       Appearance: Normal appearance.   HENT:      Head: Normocephalic.   Abdominal:       Musculoskeletal:         General: Normal range of motion.      Cervical back: Normal range of motion.   Skin:     " General: Skin is warm and dry.      Capillary Refill: Capillary refill takes less than 2 seconds.   Neurological:      General: No focal deficit present.      Mental Status: She is alert and oriented to person, place, and time.   Psychiatric:         Mood and Affect: Mood normal.         Behavior: Behavior normal.         Thought Content: Thought content normal.         Judgment: Judgment normal.        Result Review :            Current Outpatient Medications on File Prior to Visit   Medication Sig Dispense Refill    acetaminophen (TYLENOL) 325 MG tablet Take 2 tablets by mouth Every 4 (Four) Hours As Needed for Mild Pain.      estradiol (ESTRACE) 1 MG tablet TAKE 1 TABLET BY MOUTH EVERY DAY 90 tablet 1    Hydrocortisone, Perianal, (ANUSOL-HC) 2.5 % rectal cream 1 (ONE) APPLICATION AS NEEDED FOR HEMORROIDS      TiZANidine (ZANAFLEX) 2 MG capsule Take 1 capsule by mouth 3 (Three) Times a Day As Needed for Muscle Spasms. 30 capsule 0    [DISCONTINUED] lidocaine (LIDODERM) 5 % APPLY TO INTACT SKIN FOR UP TO 12 HOURS WITHIN A 24 HOUR PERIOD (Patient not taking: Reported on 9/12/2024)       No current facility-administered medications on file prior to visit.                Assessment and Plan     Diagnoses and all orders for this visit:    1. Left lower quadrant abdominal pain (Primary)  -     US Abdomen Complete    2. Abdominal wall lump  -     US Abdomen Complete        Patient Instructions   Continue over-the-counter pain reliever. Recommend limiting twisting and pulling motions. US ordered. Scheduling center to call with appointment. Follow-up as scheduled.            Follow Up     Return for Next scheduled follow up.  Patient was given instructions and counseling regarding her condition or for health maintenance advice. Please see specific information pulled into the AVS if appropriate.       Answers submitted by the patient for this visit:  Primary Reason for Visit (Submitted on 9/11/2024)  What is the primary  reason for your visit?: Abdominal Pain

## 2024-09-19 ENCOUNTER — HOSPITAL ENCOUNTER (OUTPATIENT)
Dept: MAMMOGRAPHY | Facility: HOSPITAL | Age: 64
Discharge: HOME | End: 2024-09-19
Payer: COMMERCIAL

## 2024-09-19 ENCOUNTER — HOSPITAL ENCOUNTER (OUTPATIENT)
Facility: HOSPITAL | Age: 64
Discharge: HOME | End: 2024-09-19
Payer: COMMERCIAL

## 2024-09-19 ENCOUNTER — TELEPHONE (OUTPATIENT)
Dept: INTERNAL MEDICINE | Facility: CLINIC | Age: 64
End: 2024-09-19

## 2024-09-19 ENCOUNTER — OFFICE VISIT (OUTPATIENT)
Dept: INTERNAL MEDICINE | Facility: CLINIC | Age: 64
End: 2024-09-19
Payer: COMMERCIAL

## 2024-09-19 VITALS
WEIGHT: 187 LBS | DIASTOLIC BLOOD PRESSURE: 78 MMHG | OXYGEN SATURATION: 95 % | SYSTOLIC BLOOD PRESSURE: 128 MMHG | BODY MASS INDEX: 32.1 KG/M2 | HEART RATE: 72 BPM

## 2024-09-19 DIAGNOSIS — K64.9 HEMORRHOIDS, UNSPECIFIED HEMORRHOID TYPE: ICD-10-CM

## 2024-09-19 DIAGNOSIS — Z12.31 SCREENING MAMMOGRAM FOR BREAST CANCER: ICD-10-CM

## 2024-09-19 DIAGNOSIS — E55.9 VITAMIN D DEFICIENCY: ICD-10-CM

## 2024-09-19 DIAGNOSIS — Z00.00 ANNUAL PHYSICAL EXAM: ICD-10-CM

## 2024-09-19 DIAGNOSIS — R10.32 LEFT LOWER QUADRANT ABDOMINAL PAIN: ICD-10-CM

## 2024-09-19 DIAGNOSIS — N89.8 VAGINAL SCAR: ICD-10-CM

## 2024-09-19 DIAGNOSIS — R53.83 LOW ENERGY: ICD-10-CM

## 2024-09-19 DIAGNOSIS — R25.2 LEG CRAMPING: Primary | ICD-10-CM

## 2024-09-19 PROBLEM — M17.11 OSTEOARTHRITIS OF RIGHT KNEE: Status: ACTIVE | Noted: 2023-09-18

## 2024-09-19 LAB
25(OH)D3+25(OH)D2 SERPL-MCNC: 53 NG/ML (ref 30–100)
ALBUMIN SERPL-MCNC: 4.3 G/DL (ref 3.5–5.2)
ALBUMIN/GLOB SERPL: 1.5 G/DL
ALP SERPL-CCNC: 75 U/L (ref 39–117)
ALT SERPL-CCNC: 18 U/L (ref 1–33)
APPEARANCE UR: CLEAR
AST SERPL-CCNC: 21 U/L (ref 1–32)
BACTERIA #/AREA URNS HPF: NORMAL /HPF
BASOPHILS # BLD AUTO: 0.04 10*3/MM3 (ref 0–0.2)
BASOPHILS NFR BLD AUTO: 0.6 % (ref 0–1.5)
BILIRUB SERPL-MCNC: 0.5 MG/DL (ref 0–1.2)
BILIRUB UR QL STRIP: NEGATIVE
BUN SERPL-MCNC: 15 MG/DL (ref 8–23)
BUN/CREAT SERPL: 18.8 (ref 7–25)
CALCIUM SERPL-MCNC: 9.8 MG/DL (ref 8.6–10.5)
CASTS URNS MICRO: NORMAL
CHLORIDE SERPL-SCNC: 104 MMOL/L (ref 98–107)
CHOLEST SERPL-MCNC: 214 MG/DL (ref 0–200)
CHOLEST/HDLC SERPL: 3.75 {RATIO}
CO2 SERPL-SCNC: 26.6 MMOL/L (ref 22–29)
COLOR UR: YELLOW
CREAT SERPL-MCNC: 0.8 MG/DL (ref 0.57–1)
EGFRCR SERPLBLD CKD-EPI 2021: 82.4 ML/MIN/1.73
EOSINOPHIL # BLD AUTO: 0.08 10*3/MM3 (ref 0–0.4)
EOSINOPHIL NFR BLD AUTO: 1.3 % (ref 0.3–6.2)
EPI CELLS #/AREA URNS HPF: NORMAL /HPF
ERYTHROCYTE [DISTWIDTH] IN BLOOD BY AUTOMATED COUNT: 13 % (ref 12.3–15.4)
FOLATE SERPL-MCNC: 14.5 NG/ML (ref 4.78–24.2)
GLOBULIN SER CALC-MCNC: 2.8 GM/DL
GLUCOSE SERPL-MCNC: 96 MG/DL (ref 65–99)
GLUCOSE UR QL STRIP: NEGATIVE
HBA1C MFR BLD: 5.9 % (ref 4.8–5.6)
HCT VFR BLD AUTO: 44.6 % (ref 34–46.6)
HDLC SERPL-MCNC: 57 MG/DL (ref 40–60)
HGB BLD-MCNC: 14.5 G/DL (ref 12–15.9)
HGB UR QL STRIP: ABNORMAL
IMM GRANULOCYTES # BLD AUTO: 0.01 10*3/MM3 (ref 0–0.05)
IMM GRANULOCYTES NFR BLD AUTO: 0.2 % (ref 0–0.5)
KETONES UR QL STRIP: NEGATIVE
LDLC SERPL CALC-MCNC: 141 MG/DL (ref 0–100)
LEUKOCYTE ESTERASE UR QL STRIP: NEGATIVE
LYMPHOCYTES # BLD AUTO: 1.67 10*3/MM3 (ref 0.7–3.1)
LYMPHOCYTES NFR BLD AUTO: 27.1 % (ref 19.6–45.3)
MAGNESIUM SERPL-MCNC: 2.3 MG/DL (ref 1.6–2.4)
MCH RBC QN AUTO: 29.4 PG (ref 26.6–33)
MCHC RBC AUTO-ENTMCNC: 32.5 G/DL (ref 31.5–35.7)
MCV RBC AUTO: 90.5 FL (ref 79–97)
MONOCYTES # BLD AUTO: 0.51 10*3/MM3 (ref 0.1–0.9)
MONOCYTES NFR BLD AUTO: 8.3 % (ref 5–12)
NEUTROPHILS # BLD AUTO: 3.85 10*3/MM3 (ref 1.7–7)
NEUTROPHILS NFR BLD AUTO: 62.5 % (ref 42.7–76)
NITRITE UR QL STRIP: NEGATIVE
NRBC BLD AUTO-RTO: 0 /100 WBC (ref 0–0.2)
PH UR STRIP: 6.5 [PH] (ref 5–8)
PLATELET # BLD AUTO: 239 10*3/MM3 (ref 140–450)
POTASSIUM SERPL-SCNC: 4.4 MMOL/L (ref 3.5–5.2)
PROT SERPL-MCNC: 7.1 G/DL (ref 6–8.5)
PROT UR QL STRIP: NEGATIVE
RBC # BLD AUTO: 4.93 10*6/MM3 (ref 3.77–5.28)
RBC #/AREA URNS HPF: NORMAL /HPF
SODIUM SERPL-SCNC: 140 MMOL/L (ref 136–145)
SP GR UR STRIP: 1.01 (ref 1–1.03)
TRIGL SERPL-MCNC: 91 MG/DL (ref 0–150)
TSH SERPL DL<=0.005 MIU/L-ACNC: 1.3 UIU/ML (ref 0.27–4.2)
UROBILINOGEN UR STRIP-MCNC: ABNORMAL MG/DL
VIT B12 SERPL-MCNC: 923 PG/ML (ref 211–946)
VLDLC SERPL CALC-MCNC: 16 MG/DL (ref 5–40)
WBC # BLD AUTO: 6.16 10*3/MM3 (ref 3.4–10.8)
WBC #/AREA URNS HPF: NORMAL /HPF

## 2024-09-19 PROCEDURE — 77067 SCR MAMMO BI INCL CAD: CPT

## 2024-09-19 PROCEDURE — 77063 BREAST TOMOSYNTHESIS BI: CPT

## 2024-09-19 PROCEDURE — 99396 PREV VISIT EST AGE 40-64: CPT

## 2024-09-19 PROCEDURE — 76700 US EXAM ABDOM COMPLETE: CPT

## 2024-09-19 RX ORDER — HYDROCORTISONE 25 MG/G
CREAM TOPICAL 2 TIMES DAILY
Qty: 28 G | Refills: 1 | Status: SHIPPED | OUTPATIENT
Start: 2024-09-19

## 2024-09-19 RX ORDER — ESTRADIOL 1 MG/1
1 TABLET ORAL DAILY
Qty: 90 TABLET | Refills: 1 | Status: SHIPPED | OUTPATIENT
Start: 2024-09-19

## 2024-09-24 ENCOUNTER — TELEPHONE (OUTPATIENT)
Dept: INTERNAL MEDICINE | Facility: CLINIC | Age: 64
End: 2024-09-24
Payer: COMMERCIAL

## 2024-09-27 ENCOUNTER — TELEPHONE (OUTPATIENT)
Dept: INTERNAL MEDICINE | Facility: CLINIC | Age: 64
End: 2024-09-27
Payer: COMMERCIAL

## 2024-09-27 DIAGNOSIS — R10.32 LEFT LOWER QUADRANT ABDOMINAL PAIN: Primary | ICD-10-CM

## 2024-09-27 RX ORDER — PREDNISONE 10 MG/1
TABLET ORAL
Qty: 26 TABLET | Refills: 0 | Status: SHIPPED | OUTPATIENT
Start: 2024-09-27

## 2024-10-09 DIAGNOSIS — M62.838 MUSCLE SPASM: ICD-10-CM

## 2024-10-09 RX ORDER — TIZANIDINE HYDROCHLORIDE 2 MG/1
2 CAPSULE, GELATIN COATED ORAL 3 TIMES DAILY PRN
Qty: 30 CAPSULE | Refills: 0 | Status: SHIPPED | OUTPATIENT
Start: 2024-10-09

## 2024-10-09 NOTE — TELEPHONE ENCOUNTER
Caller: Cary Correa    Relationship: Self    Best call back number:     Requested Prescriptions:   Requested Prescriptions     Pending Prescriptions Disp Refills    TiZANidine (ZANAFLEX) 2 MG capsule 30 capsule 0     Sig: Take 1 capsule by mouth 3 (Three) Times a Day As Needed for Muscle Spasms.        Pharmacy where request should be sent: [facility] #[id number] - [place], [place] - [address] - [phone]  - [phone] FX     Last office visit with prescribing clinician: 9/19/2024   Last telemedicine visit with prescribing clinician: Visit date not found   Next office visit with prescribing clinician: 3/19/2025     Additional details provided by patient: PATIENT IS OUT OF THIS MEDICATION    Does the patient have less than a 3 day supply:  [x] Yes  [] No        Kanika Mercedes Rep   10/09/24 10:15 EDT

## 2024-10-15 ENCOUNTER — OFFICE VISIT (OUTPATIENT)
Dept: SURGERY | Facility: CLINIC | Age: 64
End: 2024-10-15
Payer: COMMERCIAL

## 2024-10-15 VITALS
WEIGHT: 190.6 LBS | DIASTOLIC BLOOD PRESSURE: 80 MMHG | BODY MASS INDEX: 32.54 KG/M2 | HEART RATE: 71 BPM | OXYGEN SATURATION: 97 % | HEIGHT: 64 IN | SYSTOLIC BLOOD PRESSURE: 128 MMHG

## 2024-10-15 DIAGNOSIS — K64.4 EXTERNAL HEMORRHOIDS: ICD-10-CM

## 2024-10-15 DIAGNOSIS — R10.9 LEFT SIDED ABDOMINAL PAIN: ICD-10-CM

## 2024-10-15 DIAGNOSIS — K64.8 INTERNAL HEMORRHOIDS: Primary | ICD-10-CM

## 2024-10-15 PROCEDURE — 46600 DIAGNOSTIC ANOSCOPY SPX: CPT | Performed by: PHYSICIAN ASSISTANT

## 2024-10-15 PROCEDURE — 99204 OFFICE O/P NEW MOD 45 MIN: CPT | Performed by: PHYSICIAN ASSISTANT

## 2024-10-15 RX ORDER — SODIUM CHLORIDE 0.9 % (FLUSH) 0.9 %
3 SYRINGE (ML) INJECTION EVERY 12 HOURS SCHEDULED
OUTPATIENT
Start: 2024-10-15

## 2024-10-15 RX ORDER — SODIUM CHLORIDE 9 MG/ML
40 INJECTION, SOLUTION INTRAVENOUS AS NEEDED
OUTPATIENT
Start: 2024-10-15 | End: 2024-10-15

## 2024-10-15 RX ORDER — SODIUM CHLORIDE 0.9 % (FLUSH) 0.9 %
3-10 SYRINGE (ML) INJECTION AS NEEDED
OUTPATIENT
Start: 2024-10-15

## 2024-10-15 NOTE — PROGRESS NOTES
Cary Correa is a 64 y.o. female who is seen as a consult at the request of Self Referring for Hemorrhoids.      HPI:  Pt presents today for evaluation of hemorrhoids.   States she has experienced intermittent swelling and pain for ~20 years.  Rarely bleeds and denies any bleeding for several years.   Area seems more inflamed after bowel movements.  She has tried various OTC and prescription hemorrhoid suppositories and creams.   She would like to discuss more definitive treatment options.     Pt typically has 1 BM daily.  [place]: 3-4 most often, but occasionally a 1-2.  Pt states that she used to strain a lot in the past, but after increasing her fluid intake, goes more easily.   Occasionally takes a laxative if she feels constipated.     Pt also expresses concern for left-sided abdominal pain.   States that the pain started in 07/2024 after lifting a heavy suitcase.   She now feels a bulge in that area as well as pain.   Has to sit leaning to the right to reduce the pain.   Had a US performed in 09/2024, but unable to find source of her pain.  Pt states that during the U/S, the area of concern was not examined and she is worried something was missed.     Not taking anticoagulation.   No previous anorectal surgeries.   Negative Cologuard in 2019.   No previous colonoscopy.   No known FHx of colon polyps, colon cancer, or IBD.      Past Medical History:   Diagnosis Date    Allergic     Penicillin    Cystocele     Enterocele     Hypercholesterolemia     Prolapse of uterus     Rectocele     Skin lesion     MONS PUBIS       Past Surgical History:   Procedure Laterality Date    ANTERIOR AND POSTERIOR VAGINAL REPAIR N/A 08/24/2016    Procedure: ANTERIOR AND POSTERIOR  REPAIR;  Surgeon: Dilip Bond MD;  Location: [facility];  Service:     ANTERIOR AND POSTERIOR VAGINAL REPAIR N/A 08/10/2017    Procedure:  ANTERIOR & POSTERIOR COLPORRHAPHY, ABDOMINAL ENTERCELE REPAIR, CYSTOURETHROSCOPY, RELEASE VAGINAL SCAR  BAND, RECONSTRUCT PERINEAL BODY, REMOVE LESION ON MONSPUBIS;  Surgeon: Sonia Alvarez MD;  Location:  [facility];  Service:     BREAST CYST ASPIRATION  2021    LAPAROSCOPIC ASSISTED VAGINAL HYSTERECTOMY N/A 08/24/2016    Procedure: LAVH AND BSO;  Surgeon: Dilip Bond MD;  Location:  [facility];  Service:     PLANTAR FASCIECTOMY Left 12/08/2009    Dr. Brandyn Swan    SACROCOLPOPEXY N/A 08/10/2017    Procedure: SACRAL COLPOPEXY, SACROSPINOUS LIGAMENT FIXATION, EXTRAPERITONEAL COLPOPEXY,;  Surgeon: Sonia Alvarez MD;  [facility];  Service:     US GUIDED CYST ASPIRATION BREAST N/A 07/30/2021    UTEROSACRAL SUSPENSION LAPAROSCOPIC N/A 08/10/2017    Procedure: LAPAROSCOPIC UTEROSACRAL LIGMENT SUSPENSION, ;  Surgeon: Sonia Alvarez MD;  Location: [facility] MAIN OR;  Service:        Social History:   reports that she has never smoked. She has never been exposed to tobacco smoke. She has never used smokeless tobacco. She reports that she does not drink alcohol and does not use drugs.      Marriage status:     Family History   Problem Relation Age of Onset    Hypertension Mother     Hyperlipidemia Mother     Diabetes Father     Malig Hyperthermia Neg Hx     Breast cancer Neg Hx          Current Outpatient Medications:     acetaminophen (TYLENOL) 325 MG tablet, Take 2 tablets by mouth Every 4 (Four) Hours As Needed for Mild Pain., Disp: , Rfl:     estradiol (ESTRACE) 1 MG tablet, Take 1 tablet by mouth Daily., Disp: 90 tablet, Rfl: 1    Hydrocortisone, Perianal, (ANUSOL-HC) 2.5 % rectal cream, Insert  into the rectum 2 (Two) Times a Day., Disp: 28 g, Rfl: 1    Allergy  Lactose intolerance (gi), Penicillins, and Codeine      Vitals:    10/15/24 1104   BP: 128/80   Pulse: 71   SpO2: 97%     Body mass index is 32.72 kg/m².        Physical Exam  Exam conducted with a chaperone present.   Constitutional:       General: She is not in acute distress.     Appearance: She is well-developed.   HENT:       Head: Normocephalic and atraumatic.      Nose: Nose normal.   Eyes:      Conjunctiva/sclera: Conjunctivae normal.      Pupils: Pupils are equal, round, and reactive to light.   Neck:      Trachea: No tracheal deviation.   Pulmonary:      Effort: Pulmonary effort is normal. No respiratory distress.      Breath sounds: Normal breath sounds.   Abdominal:      General: Bowel sounds are normal. There is no distension.      Palpations: Abdomen is soft.   Genitourinary:     Comments: Perianal exam: Mild to moderate enlargement of the external hemorrhoids. Soft, non-thrombosed.   JOANN- Good tone, no masses  Anoscopy performed: Grade II x 3 internal hemorrhoids.      Musculoskeletal:         General: No deformity. Normal range of motion.      Cervical back: Normal range of motion.   Skin:     General: Skin is warm and dry.   Neurological:      Mental Status: She is alert and oriented to person, place, and time.      Cranial Nerves: No cranial nerve deficit.      Coordination: Coordination normal.      Gait: Gait normal.   Psychiatric:         Behavior: Behavior normal.         Judgment: Judgment normal.         Review of Medical Record:  I reviewed medical records as detailed in HPI.     Assessment:    1. Internal hemorrhoids    2. External hemorrhoids    3. Left sided abdominal pain    - New    Plan:  Hemorrhoids:  - Discussed common causes of hemorrhoid irritation and enlargement.   - Given that she has failed conservative management with topical steroids, recommended a hemorrhoidectomy. Discussed risks including bleeding, infection, injury to sphincters; benefits; and alternatives. Discussed in detail expected recovery, possible urinary retention, time off activities, and healing. Pt expresses understanding and wishes to proceed. Will tentatively schedule surgery as well as a Telehealth visit with Dr. Willy irene.     Left-sided abdominal pain:  - Likely due to hernia or muscle strain. Offered to schedule CT for further  evaluation. Pt states she would like to discuss cost with her insurance before scheduling. She will call our office if she would like to proceed.

## 2024-10-16 ENCOUNTER — TELEPHONE (OUTPATIENT)
Dept: INTERNAL MEDICINE | Facility: CLINIC | Age: 64
End: 2024-10-16

## 2024-10-16 NOTE — TELEPHONE ENCOUNTER
Caller: Cary Correa    Relationship: Self    Best call back number: [phone]     Who are you requesting to speak with (clinical staff, provider,  specific staff member): MARLENI VILLARREAL OR MA    What was the call regarding: PATIENT STATES THAT DURING HER PHYSICAL, SHE HAD MENTIONED LEFT SIDE ABDOMINAL PAIN. SHE HAD A SONOGRAM, AND THE RESULTS WERE NEGATIVE, BUT SHE WAS PRESCRIBED SOME MEDICATION FOR PAIN AND INFLAMMATION. THESE DID NOT SEEM TO HELP. SHE ALSO MENTIONED THAT SHE GOT MEDICINE FOR HEMORRHOIDS, AND THAT HAS NOT GOTTEN BETTER EITHER. SHE THEN WENT TO A [facility] SPECIALIST, AND THEY DID THAT THEY DID A CT OF THE AND RECOMMENDED SURGERY FOR HEMORRHOIDS. SHE WANTED TO FOLLOW UP WITH PCP. PLEASE CALL AND ADVISE

## 2024-10-17 PROBLEM — K64.4 EXTERNAL HEMORRHOIDS: Status: ACTIVE | Noted: 2024-10-15

## 2024-10-17 PROBLEM — K64.8 INTERNAL HEMORRHOIDS: Status: ACTIVE | Noted: 2024-10-15

## 2024-10-18 DIAGNOSIS — R10.32 ABDOMINAL PAIN, LLQ: Primary | ICD-10-CM

## 2024-10-18 NOTE — TELEPHONE ENCOUNTER
Recommend CT as advised. Chronic hemorrhoid pain may be causing referring pain in her side. If symptoms persist and looking for definitive treatment, then recommend pursuing surgery with Dr. Aguilera's office.

## 2024-11-06 ENCOUNTER — PRE-ADMISSION TESTING (OUTPATIENT)
Dept: PREADMISSION TESTING | Facility: HOSPITAL | Age: 64
End: 2024-11-06
Payer: COMMERCIAL

## 2024-11-06 VITALS
SYSTOLIC BLOOD PRESSURE: 104 MMHG | HEART RATE: 81 BPM | TEMPERATURE: 98.1 F | DIASTOLIC BLOOD PRESSURE: 71 MMHG | BODY MASS INDEX: 31.91 KG/M2 | RESPIRATION RATE: 18 BRPM | OXYGEN SATURATION: 98 % | WEIGHT: 186.9 LBS | HEIGHT: 64 IN

## 2024-11-06 DIAGNOSIS — K64.8 INTERNAL HEMORRHOIDS: ICD-10-CM

## 2024-11-06 DIAGNOSIS — K64.4 EXTERNAL HEMORRHOIDS: ICD-10-CM

## 2024-11-06 LAB
ALBUMIN SERPL-MCNC: 4.1 G/DL (ref 3.5–5.2)
ALBUMIN/GLOB SERPL: 1.2 G/DL
ALP SERPL-CCNC: 73 U/L (ref 39–117)
ALT SERPL W P-5'-P-CCNC: 24 U/L (ref 1–33)
ANION GAP SERPL CALCULATED.3IONS-SCNC: 9 MMOL/L (ref 5–15)
AST SERPL-CCNC: 27 U/L (ref 1–32)
BASOPHILS # BLD AUTO: 0.03 10*3/MM3 (ref 0–0.2)
BASOPHILS NFR BLD AUTO: 0.4 % (ref 0–1.5)
BILIRUB SERPL-MCNC: 0.4 MG/DL (ref 0–1.2)
BUN SERPL-MCNC: 13 MG/DL (ref 8–23)
BUN/CREAT SERPL: 15.1 (ref 7–25)
CALCIUM SPEC-SCNC: 9.3 MG/DL (ref 8.6–10.5)
CHLORIDE SERPL-SCNC: 102 MMOL/L (ref 98–107)
CO2 SERPL-SCNC: 25 MMOL/L (ref 22–29)
CREAT SERPL-MCNC: 0.86 MG/DL (ref 0.57–1)
DEPRECATED RDW RBC AUTO: 42.5 FL (ref 37–54)
EGFRCR SERPLBLD CKD-EPI 2021: 75.5 ML/MIN/1.73
EOSINOPHIL # BLD AUTO: 0.07 10*3/MM3 (ref 0–0.4)
EOSINOPHIL NFR BLD AUTO: 1 % (ref 0.3–6.2)
ERYTHROCYTE [DISTWIDTH] IN BLOOD BY AUTOMATED COUNT: 12.9 % (ref 12.3–15.4)
GLOBULIN UR ELPH-MCNC: 3.3 GM/DL
GLUCOSE SERPL-MCNC: 97 MG/DL (ref 65–99)
HCT VFR BLD AUTO: 44.7 % (ref 34–46.6)
HGB BLD-MCNC: 14.6 G/DL (ref 12–15.9)
IMM GRANULOCYTES # BLD AUTO: 0.02 10*3/MM3 (ref 0–0.05)
IMM GRANULOCYTES NFR BLD AUTO: 0.3 % (ref 0–0.5)
LYMPHOCYTES # BLD AUTO: 1.69 10*3/MM3 (ref 0.7–3.1)
LYMPHOCYTES NFR BLD AUTO: 24.5 % (ref 19.6–45.3)
MCH RBC QN AUTO: 29.6 PG (ref 26.6–33)
MCHC RBC AUTO-ENTMCNC: 32.7 G/DL (ref 31.5–35.7)
MCV RBC AUTO: 90.7 FL (ref 79–97)
MONOCYTES # BLD AUTO: 0.79 10*3/MM3 (ref 0.1–0.9)
MONOCYTES NFR BLD AUTO: 11.4 % (ref 5–12)
NEUTROPHILS NFR BLD AUTO: 4.31 10*3/MM3 (ref 1.7–7)
NEUTROPHILS NFR BLD AUTO: 62.4 % (ref 42.7–76)
NRBC BLD AUTO-RTO: 0 /100 WBC (ref 0–0.2)
PLATELET # BLD AUTO: 282 10*3/MM3 (ref 140–450)
PMV BLD AUTO: 11.1 FL (ref 6–12)
POTASSIUM SERPL-SCNC: 3.9 MMOL/L (ref 3.5–5.2)
PROT SERPL-MCNC: 7.4 G/DL (ref 6–8.5)
QT INTERVAL: 387 MS
QTC INTERVAL: 430 MS
RBC # BLD AUTO: 4.93 10*6/MM3 (ref 3.77–5.28)
SODIUM SERPL-SCNC: 136 MMOL/L (ref 136–145)
WBC NRBC COR # BLD AUTO: 6.91 10*3/MM3 (ref 3.4–10.8)

## 2024-11-06 PROCEDURE — 80053 COMPREHEN METABOLIC PANEL: CPT

## 2024-11-06 PROCEDURE — 93005 ELECTROCARDIOGRAM TRACING: CPT

## 2024-11-06 PROCEDURE — 85025 COMPLETE CBC W/AUTO DIFF WBC: CPT

## 2024-11-06 PROCEDURE — 93010 ELECTROCARDIOGRAM REPORT: CPT | Performed by: INTERNAL MEDICINE

## 2024-11-06 PROCEDURE — 36415 COLL VENOUS BLD VENIPUNCTURE: CPT

## 2024-11-06 NOTE — DISCHARGE INSTRUCTIONS
Take the following medications the morning of surgery: NONE      If you are on prescription narcotic pain medication to control your pain you may also take that medication the morning of surgery.      General Instructions:     Do not eat solid food after midnight the night before surgery.  Clear liquids day of surgery are allowed but must be stopped at least two hours before your hospital arrival time.       Allowed clear liquids      Water, sodas, and tea or coffee with no cream or milk added.       12 to 20 ounces of a clear liquid that contains carbohydrates is recommended.  If non-diabetic, have Gatorade or Powerade.  If diabetic, have G2 or Powerade Zero.     Do not have liquids red in color.  Do not consume chicken, beef, pork or vegetable broth or bouillon cubes of any variety as they are not considered clear liquids and are not allowed.      Infants may have breast milk up to four hours before surgery.  Infants drinking formula may drink formula up to six hours before surgery.   Patients who avoid smoking, chewing tobacco and alcohol for 4 weeks prior to surgery have a reduced risk of post-operative complications.  Quit smoking as many days before surgery as you can.  Do not smoke, use chewing tobacco or drink alcohol the day of surgery.   If applicable bring your C-PAP/ BI-PAP machine in with you to preop day of surgery.  Bring any papers given to you in the doctor’s office.  Wear clean comfortable clothes.  Do not wear contact lenses, false eyelashes or make-up.  Bring a case for your glasses.   Bring crutches or walker if applicable.  Remove all piercings.  Leave jewelry and any other valuables at home.  Hair extensions with metal clips must be removed prior to surgery.  The Pre-Admission Testing nurse will instruct you to bring medications if unable to obtain an accurate list in Pre-Admission Testing.        If you were given a blood bank ID arm band remember to bring it with you the day of  surgery.    Preventing a Surgical Site Infection:  For 2 to 3 days before surgery, avoid shaving with a razor because the razor can irritate skin and make it easier to develop an infection.    Any areas of open skin can increase the risk of a post-operative wound infection by allowing bacteria to enter and travel throughout the body.  Notify your surgeon if you have any skin wounds / rashes even if it is not near the expected surgical site.  The area will need assessed to determine if surgery should be delayed until it is healed.  The night prior to surgery shower using a fresh bar of anti-bacterial soap (such as Dial) and clean washcloth.  Sleep in a clean bed with clean clothing.  Do not allow pets to sleep with you.  Shower on the morning of surgery using a fresh bar of anti-bacterial soap (such as Dial) and clean washcloth.  Dry with a clean towel and dress in clean clothing.  Ask your surgeon if you will be receiving antibiotics prior to surgery.  Make sure you, your family, and all healthcare providers clean their hands with soap and water or an alcohol based hand  before caring for you or your wound.    Day of surgery:  Your arrival time is approximately two hours before your scheduled surgery time.  Please note if you have an early arrival time the surgery doors do not open before 5:00 AM.  Upon arrival, a Pre-op nurse and Anesthesiologist will review your health history, obtain vital signs, and answer questions you may have.  The only belongings needed at this time will be a list of your home medications and if applicable your C-PAP/BI-PAP machine.  A Pre-op nurse will start an IV and you may receive medication in preparation for surgery, including something to help you relax.     Please be aware that surgery does come with discomfort.  We want to make every effort to control your discomfort so please discuss any uncontrolled symptoms with your nurse.   Your doctor will most likely have prescribed  pain medications.      If you are going home after surgery you will receive individualized written care instructions before being discharged.  A responsible adult must drive you to and from the hospital on the day of your surgery and ideally stay with you through the night.   .  Discharge prescriptions can be filled by the hospital pharmacy during regular pharmacy hours.  If you are having surgery late in the day/evening your prescription may be e-prescribed to your pharmacy.  Please verify your pharmacy hours or chose a 24 hour pharmacy to avoid not having access to your prescription because your pharmacy has closed for the day.    If you are staying overnight following surgery, you will be transported to your hospital room following the recovery period.  [facility] has all private rooms.    If you have any questions please call Pre-Admission Testing at [phone].  Deductibles and co-payments are collected on the day of service. Please be prepared to pay the required co-pay, deductible or deposit on the day of service as defined by your plan.    Call your surgeon immediately if you experience any of the following symptoms:  Sore Throat  Shortness of Breath or difficulty breathing  Cough  Chills  Body soreness or muscle pain  Headache  Fever  New loss of taste or smell  Do not arrive for your surgery ill.  Your procedure will need to be rescheduled to another time.  You will need to call your physician before the day of surgery to avoid any unnecessary exposure to hospital staff as well as other patients.

## 2024-11-15 ENCOUNTER — HOSPITAL ENCOUNTER (OUTPATIENT)
Facility: HOSPITAL | Age: 64
Discharge: HOME | End: 2024-11-15
Admitting: PHYSICIAN ASSISTANT
Payer: COMMERCIAL

## 2024-11-15 ENCOUNTER — TELEMEDICINE (OUTPATIENT)
Dept: SURGERY | Facility: CLINIC | Age: 64
End: 2024-11-15
Payer: COMMERCIAL

## 2024-11-15 DIAGNOSIS — R10.32 ABDOMINAL PAIN, LLQ: ICD-10-CM

## 2024-11-15 DIAGNOSIS — K64.4 EXTERNAL HEMORRHOIDS: ICD-10-CM

## 2024-11-15 DIAGNOSIS — K64.8 INTERNAL HEMORRHOIDS: Primary | ICD-10-CM

## 2024-11-15 PROCEDURE — 25510000002 DIATRIZOATE MEGLUMINE & SODIUM PER 1 ML: Performed by: PHYSICIAN ASSISTANT

## 2024-11-15 PROCEDURE — 25510000001 IOPAMIDOL 61 % SOLUTION: Performed by: PHYSICIAN ASSISTANT

## 2024-11-15 PROCEDURE — 99213 OFFICE O/P EST LOW 20 MIN: CPT | Performed by: COLON & RECTAL SURGERY

## 2024-11-15 PROCEDURE — 74177 CT ABD & PELVIS W/CONTRAST: CPT

## 2024-11-15 RX ORDER — IOPAMIDOL 612 MG/ML
100 INJECTION, SOLUTION INTRAVASCULAR
Status: COMPLETED | OUTPATIENT
Start: 2024-11-15 | End: 2024-11-15

## 2024-11-15 RX ORDER — DIATRIZOATE MEGLUMINE AND DIATRIZOATE SODIUM 660; 100 MG/ML; MG/ML
30 SOLUTION ORAL; RECTAL
Status: COMPLETED | OUTPATIENT
Start: 2024-11-15 | End: 2024-11-15

## 2024-11-15 RX ADMIN — IOPAMIDOL 85 ML: 612 INJECTION, SOLUTION INTRAVENOUS at 11:07

## 2024-11-15 RX ADMIN — DIATRIZOATE MEGLUMINE AND DIATRIZOATE SODIUM 30 ML: 600; 100 SOLUTION ORAL; RECTAL at 10:09

## 2024-11-15 NOTE — PROGRESS NOTES
Cary Correa is a 64 y.o. female in for follow up of Internal hemorrhoids    External hemorrhoids    History of Present Illness  The patient is a 64-year-old female who has enlarged hemorrhoids that have not responded to conservative management and is seeking surgical correction.    She has been experiencing abdominal pain and has been dealing with hemorrhoids for an extended period. Despite trying various treatments such as ointments, dietary changes, weight loss, and fiber supplements, her condition has not improved. She has also noticed weight gain over the years, which she believes has exacerbated her symptoms. She has previously used MiraLAX for relief.    Supplemental Information  She had a hysterectomy in 2016 and later had bladder repair.     Past Medical History:   Diagnosis Date    Allergic     Penicillin    Cystocele     Enterocele     Hypercholesterolemia     Prolapse of uterus     Rectocele     Skin lesion     MONS PUBIS     Past Surgical History:   Procedure Laterality Date    ANTERIOR AND POSTERIOR VAGINAL REPAIR N/A 08/24/2016    Procedure: ANTERIOR AND POSTERIOR  REPAIR;  Surgeon: Dilip Bond MD;  [place] [facility] [place] [facility] [place]:     ANTERIOR AND POSTERIOR VAGINAL REPAIR N/A 08/10/2017    Procedure:  ANTERIOR & POSTERIOR COLPORRHAPHY, ABDOMINAL ENTERCELE REPAIR, CYSTOURETHROSCOPY, RELEASE VAGINAL SCAR BAND, RECONSTRUCT PERINEAL BODY, REMOVE LESION ON MONSPUBIS;  Surgeon: Sonia Alvarez MD;  Location: [facility] OR;  Service:     BREAST CYST ASPIRATION  2021    LAPAROSCOPIC ASSISTED VAGINAL HYSTERECTOMY N/A 08/24/2016    Procedure: LAVH AND BSO;  Surgeon: Dilip Bond MD;  Location: [facility] OR [facility];  Service:     PLANTAR FASCIECTOMY Left 12/08/2009    Dr. Brandyn Swan    SACROCOLPOPEXY N/A 08/10/2017    Procedure: SACRAL COLPOPEXY, SACROSPINOUS LIGAMENT FIXATION, EXTRAPERITONEAL COLPOPEXY,;  Surgeon: Sonia Alvarez MD;  Location: [facility] OR;  Service:     US GUIDED CYST  ASPIRATION BREAST N/A 07/30/2021    UTEROSACRAL SUSPENSION LAPAROSCOPIC N/A 08/10/2017    Procedure: LAPAROSCOPIC UTEROSACRAL LIGMENT SUSPENSION, ;  Surgeon: Sonia Alvarez MD;  Location: [facility] OR;  Service:          LMP  (LMP Unknown)   There is no height or weight on file to calculate BMI.      PE:   Physical Exam    Physical Exam  Constitutional:       General: She is not in acute distress.     Appearance: She is well-developed. She is not diaphoretic.   HENT:      Head: Normocephalic and atraumatic.   Eyes:      Pupils: Pupils are equal, round, and reactive to light.   Pulmonary:      Effort: Pulmonary effort is normal.   Musculoskeletal:         General: Normal range of motion.      Cervical back: Normal range of motion.   Neurological:      Mental Status: She is alert and oriented to person, place, and time.   Psychiatric:         Speech: Speech normal.         Thought Content: Thought content normal.             Assessment & Plan  1. Hemorrhoids.  A CAT scan was performed today, with results pending. A hemorrhoidectomy is scheduled for 11/21/2024. The procedure, including its risks and benefits, was thoroughly explained. Postoperative care instructions were provided, including the use of lidocaine ointment for incision site numbness and over-the-counter MiraLAX twice daily, with adjustments as needed. Ice application was recommended to alleviate swelling. Activity restrictions were discussed, including no lifting over 15 to 20 pounds for the initial weeks post-surgery. She was advised to contact the office with any questions or concerns.       1. Internal hemorrhoids    2. External hemorrhoids       Mode of Visit: Video  Location of patient: -HOME-  Location of provider: +[facility] [place] [place] [facility]+  You have chosen to receive care through a telehealth visit.  The patient has signed the video visit consent form.  The visit included audio and video interaction. No technical issues occurred during this  visit.    Patient or patient representative verbalized consent for the use of Ambient Listening during the visit with  Marlon Aguilera MD for chart documentation. 11/21/2024  11:37 EST

## 2024-11-15 NOTE — H&P (VIEW-ONLY)
Cary Correa is a 64 y.o. female in for follow up of Internal hemorrhoids    External hemorrhoids    History of Present Illness  The patient is a 64-year-old female who has enlarged hemorrhoids that have not responded to conservative management and is seeking surgical correction.    She has been experiencing abdominal pain and has been dealing with hemorrhoids for an extended period. Despite trying various treatments such as ointments, dietary changes, weight loss, and fiber supplements, her condition has not improved. She has also noticed weight gain over the years, which she believes has exacerbated her symptoms. She has previously used MiraLAX for relief.    Supplemental Information  She had a hysterectomy in 2016 and later had bladder repair.     Past Medical History:   Diagnosis Date    Allergic     Penicillin    Cystocele     Enterocele     Hypercholesterolemia     Prolapse of uterus     Rectocele     Skin lesion     MONS PUBIS     Past Surgical History:   Procedure Laterality Date    ANTERIOR AND POSTERIOR VAGINAL REPAIR N/A 08/24/2016    Procedure: ANTERIOR AND POSTERIOR  REPAIR;  Surgeon: Dilip Bond MD;  [facility] [facility] [place];  Service:     ANTERIOR AND POSTERIOR VAGINAL REPAIR N/A 08/10/2017    Procedure:  ANTERIOR & POSTERIOR COLPORRHAPHY, ABDOMINAL ENTERCELE REPAIR, CYSTOURETHROSCOPY, RELEASE VAGINAL SCAR BAND, RECONSTRUCT PERINEAL BODY, REMOVE LESION ON MONSPUBIS;  Surgeon: Sonia Alvarez MD;  Location: [facility] OR;  Service:     BREAST CYST ASPIRATION  2021    LAPAROSCOPIC ASSISTED VAGINAL HYSTERECTOMY N/A 08/24/2016    Procedure: LAVH AND BSO;  Surgeon: Dilip Bond MD;  Location: [facility] OR [facility];  Service:     PLANTAR FASCIECTOMY Left 12/08/2009    Dr. Brandyn Swan    SACROCOLPOPEXY N/A 08/10/2017    Procedure: SACRAL COLPOPEXY, SACROSPINOUS LIGAMENT FIXATION, EXTRAPERITONEAL COLPOPEXY,;  Surgeon: Sonia Alvarez MD;  Location: [facility] OR;  Service:     US GUIDED CYST  ASPIRATION BREAST N/A 07/30/2021    UTEROSACRAL SUSPENSION LAPAROSCOPIC N/A 08/10/2017    Procedure: LAPAROSCOPIC UTEROSACRAL LIGMENT SUSPENSION, ;  Surgeon: Sonia Alvarez MD;  Location: [facility] OR;  Service:          LMP  (LMP Unknown)   There is no height or weight on file to calculate BMI.      PE:   Physical Exam    Physical Exam  Constitutional:       General: She is not in acute distress.     Appearance: She is well-developed. She is not diaphoretic.   HENT:      Head: Normocephalic and atraumatic.   Eyes:      Pupils: Pupils are equal, round, and reactive to light.   Pulmonary:      Effort: Pulmonary effort is normal.   Musculoskeletal:         General: Normal range of motion.      Cervical back: Normal range of motion.   Neurological:      Mental Status: She is alert and oriented to person, place, and time.   Psychiatric:         Speech: Speech normal.         Thought Content: Thought content normal.             Assessment & Plan  1. Hemorrhoids.  A CAT scan was performed today, with results pending. A hemorrhoidectomy is scheduled for 11/21/2024. The procedure, including its risks and benefits, was thoroughly explained. Postoperative care instructions were provided, including the use of lidocaine ointment for incision site numbness and over-the-counter MiraLAX twice daily, with adjustments as needed. Ice application was recommended to alleviate swelling. Activity restrictions were discussed, including no lifting over 15 to 20 pounds for the initial weeks post-surgery. She was advised to contact the office with any questions or concerns.       1. Internal hemorrhoids    2. External hemorrhoids       Mode of Visit: Video  Location of patient: -HOME-  Location of provider[place] [place] [facility]  You have chosen to receive care through a telehealth visit.  The patient has signed the video visit consent form.  The visit included audio and video interaction. No technical issues occurred during this  visit.    Patient or patient representative verbalized consent for the use of Ambient Listening during the visit with  Marlon Aguilera MD for chart documentation. 11/21/2024  11:37 EST

## 2024-11-21 ENCOUNTER — HOSPITAL ENCOUNTER (OUTPATIENT)
Facility: HOSPITAL | Age: 64
Setting detail: HOSPITAL OUTPATIENT SURGERY
Discharge: HOME | End: 2024-11-21
Attending: COLON & RECTAL SURGERY | Admitting: COLON & RECTAL SURGERY
Payer: COMMERCIAL

## 2024-11-21 ENCOUNTER — ANESTHESIA EVENT (OUTPATIENT)
Dept: PERIOP | Facility: HOSPITAL | Age: 64
End: 2024-11-21
Payer: COMMERCIAL

## 2024-11-21 ENCOUNTER — ANESTHESIA (OUTPATIENT)
Dept: PERIOP | Facility: HOSPITAL | Age: 64
End: 2024-11-21
Payer: COMMERCIAL

## 2024-11-21 VITALS
OXYGEN SATURATION: 96 % | HEIGHT: 64 IN | BODY MASS INDEX: 32.3 KG/M2 | SYSTOLIC BLOOD PRESSURE: 115 MMHG | RESPIRATION RATE: 16 BRPM | TEMPERATURE: 97.7 F | HEART RATE: 76 BPM | DIASTOLIC BLOOD PRESSURE: 66 MMHG | WEIGHT: 189.2 LBS

## 2024-11-21 DIAGNOSIS — K64.8 INTERNAL HEMORRHOIDS: ICD-10-CM

## 2024-11-21 DIAGNOSIS — K64.4 EXTERNAL HEMORRHOIDS: ICD-10-CM

## 2024-11-21 PROCEDURE — 88304 TISSUE EXAM BY PATHOLOGIST: CPT | Performed by: COLON & RECTAL SURGERY

## 2024-11-21 PROCEDURE — 25010000002 CLINDAMYCIN 900 MG/50ML SOLUTION: Performed by: PHYSICIAN ASSISTANT

## 2024-11-21 PROCEDURE — 25010000002 ONDANSETRON PER 1 MG: Performed by: NURSE ANESTHETIST, CERTIFIED REGISTERED

## 2024-11-21 PROCEDURE — 25010000002 BUPIVACAINE (PF) 0.25 % SOLUTION 10 ML VIAL: Performed by: COLON & RECTAL SURGERY

## 2024-11-21 PROCEDURE — 25010000002 LIDOCAINE 2% SOLUTION: Performed by: NURSE ANESTHETIST, CERTIFIED REGISTERED

## 2024-11-21 PROCEDURE — 25010000002 KETOROLAC TROMETHAMINE PER 15 MG: Performed by: NURSE ANESTHETIST, CERTIFIED REGISTERED

## 2024-11-21 PROCEDURE — 25010000002 DEXAMETHASONE SODIUM PHOSPHATE 20 MG/5ML SOLUTION: Performed by: NURSE ANESTHETIST, CERTIFIED REGISTERED

## 2024-11-21 PROCEDURE — 25010000002 FENTANYL CITRATE (PF) 50 MCG/ML SOLUTION: Performed by: NURSE ANESTHETIST, CERTIFIED REGISTERED

## 2024-11-21 PROCEDURE — 25010000002 PROPOFOL 200 MG/20ML EMULSION: Performed by: NURSE ANESTHETIST, CERTIFIED REGISTERED

## 2024-11-21 PROCEDURE — 25010000002 MAGNESIUM SULFATE PER 500 MG OF MAGNESIUM: Performed by: NURSE ANESTHETIST, CERTIFIED REGISTERED

## 2024-11-21 PROCEDURE — 25010000002 SUGAMMADEX 200 MG/2ML SOLUTION: Performed by: NURSE ANESTHETIST, CERTIFIED REGISTERED

## 2024-11-21 PROCEDURE — 46260 REMOVE IN/EX HEM GROUPS 2+: CPT | Performed by: COLON & RECTAL SURGERY

## 2024-11-21 PROCEDURE — 25010000002 LIDOCAINE 1% - EPINEPHRINE 1:100000 1 %-1:100000 SOLUTION 30 ML VIAL: Performed by: COLON & RECTAL SURGERY

## 2024-11-21 PROCEDURE — 25010000002 AZTREONAM PER 500 MG: Performed by: PHYSICIAN ASSISTANT

## 2024-11-21 PROCEDURE — 25810000003 LACTATED RINGERS PER 1000 ML: Performed by: ANESTHESIOLOGY

## 2024-11-21 RX ORDER — OXYCODONE AND ACETAMINOPHEN 7.5; 325 MG/1; MG/1
1 TABLET ORAL EVERY 4 HOURS PRN
Status: DISCONTINUED | OUTPATIENT
Start: 2024-11-21 | End: 2024-11-21 | Stop reason: HOSPADM

## 2024-11-21 RX ORDER — EPHEDRINE SULFATE 50 MG/ML
5 INJECTION, SOLUTION INTRAVENOUS ONCE AS NEEDED
Status: DISCONTINUED | OUTPATIENT
Start: 2024-11-21 | End: 2024-11-21 | Stop reason: HOSPADM

## 2024-11-21 RX ORDER — SODIUM CHLORIDE, SODIUM LACTATE, POTASSIUM CHLORIDE, CALCIUM CHLORIDE 600; 310; 30; 20 MG/100ML; MG/100ML; MG/100ML; MG/100ML
9 INJECTION, SOLUTION INTRAVENOUS CONTINUOUS
Status: DISCONTINUED | OUTPATIENT
Start: 2024-11-21 | End: 2024-11-21 | Stop reason: HOSPADM

## 2024-11-21 RX ORDER — ONDANSETRON 4 MG/1
4 TABLET, ORALLY DISINTEGRATING ORAL ONCE AS NEEDED
Status: DISCONTINUED | OUTPATIENT
Start: 2024-11-21 | End: 2024-11-21 | Stop reason: HOSPADM

## 2024-11-21 RX ORDER — HYDRALAZINE HYDROCHLORIDE 20 MG/ML
5 INJECTION INTRAMUSCULAR; INTRAVENOUS
Status: DISCONTINUED | OUTPATIENT
Start: 2024-11-21 | End: 2024-11-21 | Stop reason: HOSPADM

## 2024-11-21 RX ORDER — ACETAMINOPHEN 650 MG
TABLET, EXTENDED RELEASE ORAL AS NEEDED
Status: DISCONTINUED | OUTPATIENT
Start: 2024-11-21 | End: 2024-11-21 | Stop reason: HOSPADM

## 2024-11-21 RX ORDER — LIDOCAINE HYDROCHLORIDE 10 MG/ML
0.5 INJECTION, SOLUTION INFILTRATION; PERINEURAL ONCE AS NEEDED
Status: DISCONTINUED | OUTPATIENT
Start: 2024-11-21 | End: 2024-11-21 | Stop reason: HOSPADM

## 2024-11-21 RX ORDER — IPRATROPIUM BROMIDE AND ALBUTEROL SULFATE 2.5; .5 MG/3ML; MG/3ML
3 SOLUTION RESPIRATORY (INHALATION) ONCE AS NEEDED
Status: DISCONTINUED | OUTPATIENT
Start: 2024-11-21 | End: 2024-11-21 | Stop reason: HOSPADM

## 2024-11-21 RX ORDER — FAMOTIDINE 10 MG/ML
20 INJECTION, SOLUTION INTRAVENOUS ONCE
Status: COMPLETED | OUTPATIENT
Start: 2024-11-21 | End: 2024-11-21

## 2024-11-21 RX ORDER — HYDROCODONE BITARTRATE AND ACETAMINOPHEN 5; 325 MG/1; MG/1
1 TABLET ORAL ONCE AS NEEDED
Status: DISCONTINUED | OUTPATIENT
Start: 2024-11-21 | End: 2024-11-21 | Stop reason: HOSPADM

## 2024-11-21 RX ORDER — SODIUM CHLORIDE 0.9 % (FLUSH) 0.9 %
3 SYRINGE (ML) INJECTION EVERY 12 HOURS SCHEDULED
Status: DISCONTINUED | OUTPATIENT
Start: 2024-11-21 | End: 2024-11-21 | Stop reason: HOSPADM

## 2024-11-21 RX ORDER — DROPERIDOL 2.5 MG/ML
0.62 INJECTION, SOLUTION INTRAMUSCULAR; INTRAVENOUS
Status: DISCONTINUED | OUTPATIENT
Start: 2024-11-21 | End: 2024-11-21 | Stop reason: HOSPADM

## 2024-11-21 RX ORDER — DEXAMETHASONE SODIUM PHOSPHATE 4 MG/ML
INJECTION, SOLUTION INTRA-ARTICULAR; INTRALESIONAL; INTRAMUSCULAR; INTRAVENOUS; SOFT TISSUE AS NEEDED
Status: DISCONTINUED | OUTPATIENT
Start: 2024-11-21 | End: 2024-11-21 | Stop reason: SURG

## 2024-11-21 RX ORDER — OXYCODONE AND ACETAMINOPHEN 5; 325 MG/1; MG/1
1-2 TABLET ORAL EVERY 6 HOURS PRN
Qty: 24 TABLET | Refills: 0 | Status: SHIPPED | OUTPATIENT
Start: 2024-11-21

## 2024-11-21 RX ORDER — FLUMAZENIL 0.1 MG/ML
0.2 INJECTION INTRAVENOUS AS NEEDED
Status: DISCONTINUED | OUTPATIENT
Start: 2024-11-21 | End: 2024-11-21 | Stop reason: HOSPADM

## 2024-11-21 RX ORDER — FENTANYL CITRATE 50 UG/ML
50 INJECTION, SOLUTION INTRAMUSCULAR; INTRAVENOUS
Status: DISCONTINUED | OUTPATIENT
Start: 2024-11-21 | End: 2024-11-21 | Stop reason: HOSPADM

## 2024-11-21 RX ORDER — POLYETHYLENE GLYCOL 3350 17 G/17G
17 POWDER, FOR SOLUTION ORAL 2 TIMES DAILY
Start: 2024-11-21

## 2024-11-21 RX ORDER — SODIUM CHLORIDE 0.9 % (FLUSH) 0.9 %
3-10 SYRINGE (ML) INJECTION AS NEEDED
Status: DISCONTINUED | OUTPATIENT
Start: 2024-11-21 | End: 2024-11-21 | Stop reason: HOSPADM

## 2024-11-21 RX ORDER — PROMETHAZINE HYDROCHLORIDE 25 MG/1
25 TABLET ORAL ONCE AS NEEDED
Status: DISCONTINUED | OUTPATIENT
Start: 2024-11-21 | End: 2024-11-21 | Stop reason: HOSPADM

## 2024-11-21 RX ORDER — LABETALOL HYDROCHLORIDE 5 MG/ML
5 INJECTION, SOLUTION INTRAVENOUS
Status: DISCONTINUED | OUTPATIENT
Start: 2024-11-21 | End: 2024-11-21 | Stop reason: HOSPADM

## 2024-11-21 RX ORDER — FENTANYL CITRATE 50 UG/ML
50 INJECTION, SOLUTION INTRAMUSCULAR; INTRAVENOUS ONCE AS NEEDED
Status: DISCONTINUED | OUTPATIENT
Start: 2024-11-21 | End: 2024-11-21 | Stop reason: HOSPADM

## 2024-11-21 RX ORDER — PROMETHAZINE HYDROCHLORIDE 25 MG/1
25 SUPPOSITORY RECTAL ONCE AS NEEDED
Status: DISCONTINUED | OUTPATIENT
Start: 2024-11-21 | End: 2024-11-21 | Stop reason: HOSPADM

## 2024-11-21 RX ORDER — NALOXONE HCL 0.4 MG/ML
0.2 VIAL (ML) INJECTION AS NEEDED
Status: DISCONTINUED | OUTPATIENT
Start: 2024-11-21 | End: 2024-11-21 | Stop reason: HOSPADM

## 2024-11-21 RX ORDER — MAGNESIUM SULFATE HEPTAHYDRATE 500 MG/ML
INJECTION, SOLUTION INTRAMUSCULAR; INTRAVENOUS AS NEEDED
Status: DISCONTINUED | OUTPATIENT
Start: 2024-11-21 | End: 2024-11-21 | Stop reason: SURG

## 2024-11-21 RX ORDER — ONDANSETRON 2 MG/ML
INJECTION INTRAMUSCULAR; INTRAVENOUS AS NEEDED
Status: DISCONTINUED | OUTPATIENT
Start: 2024-11-21 | End: 2024-11-21 | Stop reason: SURG

## 2024-11-21 RX ORDER — LIDOCAINE 50 MG/G
1 OINTMENT TOPICAL EVERY 4 HOURS PRN
Qty: 35.44 G | Refills: 4 | Status: SHIPPED | OUTPATIENT
Start: 2024-11-21 | End: 2024-12-21

## 2024-11-21 RX ORDER — MIDAZOLAM HYDROCHLORIDE 1 MG/ML
1 INJECTION, SOLUTION INTRAMUSCULAR; INTRAVENOUS
Status: DISCONTINUED | OUTPATIENT
Start: 2024-11-21 | End: 2024-11-21 | Stop reason: HOSPADM

## 2024-11-21 RX ORDER — HYDROMORPHONE HYDROCHLORIDE 1 MG/ML
0.5 INJECTION, SOLUTION INTRAMUSCULAR; INTRAVENOUS; SUBCUTANEOUS
Status: DISCONTINUED | OUTPATIENT
Start: 2024-11-21 | End: 2024-11-21 | Stop reason: HOSPADM

## 2024-11-21 RX ORDER — CLINDAMYCIN PHOSPHATE 900 MG/50ML
900 INJECTION, SOLUTION INTRAVENOUS ONCE
Status: COMPLETED | OUTPATIENT
Start: 2024-11-21 | End: 2024-11-21

## 2024-11-21 RX ORDER — PROPOFOL 10 MG/ML
INJECTION, EMULSION INTRAVENOUS AS NEEDED
Status: DISCONTINUED | OUTPATIENT
Start: 2024-11-21 | End: 2024-11-21 | Stop reason: SURG

## 2024-11-21 RX ORDER — SODIUM CHLORIDE 9 MG/ML
40 INJECTION, SOLUTION INTRAVENOUS AS NEEDED
Status: ACTIVE | OUTPATIENT
Start: 2024-11-21 | End: 2024-11-21

## 2024-11-21 RX ORDER — LIDOCAINE HYDROCHLORIDE 20 MG/ML
INJECTION, SOLUTION INFILTRATION; PERINEURAL AS NEEDED
Status: DISCONTINUED | OUTPATIENT
Start: 2024-11-21 | End: 2024-11-21 | Stop reason: SURG

## 2024-11-21 RX ORDER — ROCURONIUM BROMIDE 10 MG/ML
INJECTION, SOLUTION INTRAVENOUS AS NEEDED
Status: DISCONTINUED | OUTPATIENT
Start: 2024-11-21 | End: 2024-11-21 | Stop reason: SURG

## 2024-11-21 RX ORDER — FENTANYL CITRATE 50 UG/ML
INJECTION, SOLUTION INTRAMUSCULAR; INTRAVENOUS AS NEEDED
Status: DISCONTINUED | OUTPATIENT
Start: 2024-11-21 | End: 2024-11-21 | Stop reason: SURG

## 2024-11-21 RX ORDER — ONDANSETRON 2 MG/ML
4 INJECTION INTRAMUSCULAR; INTRAVENOUS ONCE AS NEEDED
Status: DISCONTINUED | OUTPATIENT
Start: 2024-11-21 | End: 2024-11-21 | Stop reason: HOSPADM

## 2024-11-21 RX ORDER — KETOROLAC TROMETHAMINE 30 MG/ML
INJECTION, SOLUTION INTRAMUSCULAR; INTRAVENOUS AS NEEDED
Status: DISCONTINUED | OUTPATIENT
Start: 2024-11-21 | End: 2024-11-21 | Stop reason: SURG

## 2024-11-21 RX ORDER — DIPHENHYDRAMINE HYDROCHLORIDE 50 MG/ML
12.5 INJECTION INTRAMUSCULAR; INTRAVENOUS
Status: DISCONTINUED | OUTPATIENT
Start: 2024-11-21 | End: 2024-11-21 | Stop reason: HOSPADM

## 2024-11-21 RX ADMIN — AZTREONAM 2 G: 2 INJECTION, POWDER, LYOPHILIZED, FOR SOLUTION INTRAMUSCULAR; INTRAVENOUS at 11:54

## 2024-11-21 RX ADMIN — PROPOFOL 150 MG: 10 INJECTION, EMULSION INTRAVENOUS at 12:05

## 2024-11-21 RX ADMIN — LIDOCAINE HYDROCHLORIDE 100 MG: 20 INJECTION, SOLUTION INFILTRATION; PERINEURAL at 12:05

## 2024-11-21 RX ADMIN — ONDANSETRON 4 MG: 2 INJECTION INTRAMUSCULAR; INTRAVENOUS at 12:33

## 2024-11-21 RX ADMIN — DEXAMETHASONE SODIUM PHOSPHATE 8 MG: 4 INJECTION, SOLUTION INTRAMUSCULAR; INTRAVENOUS at 12:12

## 2024-11-21 RX ADMIN — KETOROLAC TROMETHAMINE 30 MG: 30 INJECTION, SOLUTION INTRAMUSCULAR at 12:26

## 2024-11-21 RX ADMIN — FENTANYL CITRATE 100 MCG: 50 INJECTION, SOLUTION INTRAMUSCULAR; INTRAVENOUS at 12:02

## 2024-11-21 RX ADMIN — CLINDAMYCIN PHOSPHATE 900 MG: 900 INJECTION, SOLUTION INTRAVENOUS at 11:54

## 2024-11-21 RX ADMIN — MAGNESIUM SULFATE HEPTAHYDRATE 2 G: 500 INJECTION, SOLUTION INTRAMUSCULAR; INTRAVENOUS at 12:12

## 2024-11-21 RX ADMIN — FAMOTIDINE 20 MG: 10 INJECTION INTRAVENOUS at 09:30

## 2024-11-21 RX ADMIN — SODIUM CHLORIDE, SODIUM LACTATE, POTASSIUM CHLORIDE, CALCIUM CHLORIDE 9 ML/HR: 20; 30; 600; 310 INJECTION, SOLUTION INTRAVENOUS at 09:21

## 2024-11-21 RX ADMIN — SUGAMMADEX 350 MG: 100 INJECTION, SOLUTION INTRAVENOUS at 12:36

## 2024-11-21 RX ADMIN — ROCURONIUM BROMIDE 50 MG: 10 INJECTION, SOLUTION INTRAVENOUS at 12:05

## 2024-11-21 RX ADMIN — OXYCODONE AND ACETAMINOPHEN 1 TABLET: 7.5; 325 TABLET ORAL at 13:15

## 2024-11-21 NOTE — DISCHARGE INSTRUCTIONS
Dr. Marlon Aguilera  [address]  [place] [id number] [phone]      Discharge Instructions for Hemorrhoidectomy/Anal Fissures/Fistulas      Go home, rest and take it easy today; however, you should get up and move about several times today to reduce the risk of developing a clot in your legs.      You may experience some dizziness or memory loss from the anesthesia.  This may last for the next 24 hours.  Someone should plan on staying with you for the first 24 hours for your safety.    Do not make any important legal decisions or sign any legal papers for the next 24 hours.      Eat and drink lightly today.  Start off with liquids, jello, soup, crackers or other bland foods at first. Please drink plenty of fluids. You may advance your diet tomorrow as tolerated as long as you do not experience any nausea or vomiting.     Some patients will have packing in their rectum.  It should come out will your first bowel movement.  You may remove it sooner yourself if it bothers you.  If it comes out on its own before your first bowel movement, do not worry about it.  It does not need to be replaced.      Begin your sitz baths tomorrow.    The best method of pain relief is a sitz bath (sitting in a tub or warm water) at least 3 times daily for 10 minutes.  This helps to reduce pain and aids with hygiene/drainage.  The drainage may have an unpleasant odor.  This is not unexpected and should be controlled with baths and showers.  If the skin around the anal area becomes irritated, you may apply Vaseline, A&D ointment or a similar barrier cream to the area.       Bleeding and drainage are to be expected and may persist for as long as 2-4 weeks.  Bleeding may occur with your bowel movements as well.  Wear a cotton liner such as a Kotex pad or a panty liner inside your underwear to protect your clothing.       You have received a prescription for a narcotic pain medicine, as you will have pain following surgery.    You will not be totally pain free, but your pain medicine should make the pain tolerable.  Please take your pain medicine as prescribed and always take your pills with food to prevent nausea. Your pain may persist for 1-3 weeks. If you are having severe pain that cannot be controlled by the pain medicine, please contact me.  Typically, patients with anal fissures will have less pain than those with hemorrhoids.      The goal is for your bowel movements to be soft which will help to minimize pain.  The pain medicine used to keep your comfortable may also cause some constipation so I recommend the following:    Miralax (17 grams)--1 capfull every day starting the day after surgery.    Keep taking fiber everyday (Citrucel, Metamucil, or Fiber-con) as directed.    If you are unable to have a bowel movement by 2 days after surgery, try Milk of Magnesia, Magnesium Citrate, or Colace.  If still unable to have a bowel movement, call the office at [phone]      No driving for 24 hours and for as long as you are taking your prescription pain medicine.  You may resume your activities gradually.       You will need to call the office at [phone] to schedule a follow-up appointment in 10-21 days.    Remember to contact me for any of the following:    Fever > 100.5 degrees  Severe pain that cannot be controlled by taking your pain pills  Severe nausea or vomiting   Significant bleeding > 1/4 cup  Any other questions or concerns

## 2024-11-21 NOTE — ANESTHESIA POSTPROCEDURE EVALUATION
"Patient: Cary Correa    Procedure Summary       Date: 11/21/24 Room / Location: [facility] OR [address] [place] [address] MAIN OR    Anesthesia Start: 1158 Anesthesia Stop: 1252    Procedure: HEMORRHOIDECTOMY X 3 (Anus) Diagnosis:       Internal hemorrhoids      External hemorrhoids      (Internal hemorrhoids [K64.8])      (External hemorrhoids [K64.4])    Surgeons: Marlon Aguilera MD Provider: Corrine Colbert MD    Anesthesia Type: general ASA Status: 2            Anesthesia Type: general    Vitals  Vitals Value Taken Time   /70 11/21/24 1315   Temp 36.5 °C (97.7 °F) 11/21/24 1325   Pulse 85 11/21/24 1327   Resp 18 11/21/24 1315   SpO2 98 % 11/21/24 1327   Vitals shown include unfiled device data.        Post Anesthesia Care and Evaluation    Level of consciousness: awake and alert  Pain management: adequate  Anesthetic complications: No anesthetic complications    Cardiovascular status: acceptable  Respiratory status: acceptable  Hydration status: acceptable    Comments: /66   Pulse 76   Temp 36.5 °C (97.7 °F) (Oral)   Resp 16   Ht 162.6 cm (64\")   Wt 85.8 kg (189 lb 3.2 oz)   LMP  (LMP Unknown)   SpO2 96%   BMI 32.48 kg/m²       "

## 2024-11-21 NOTE — ANESTHESIA PROCEDURE NOTES
Airway  Urgency: elective    Date/Time: 11/21/2024 12:10 PM  Airway not difficult    General Information and Staff    Patient location during procedure: OR  Anesthesiologist: Corrine Colbert MD  CRNA/CAA: Sienna Reyna CRNA    Indications and Patient Condition  Indications for airway management: airway protection    Preoxygenated: yes  MILS maintained throughout  Mask difficulty assessment: 2 - vent by mask + OA or adjuvant +/- NMBA    Final Airway Details  Final airway type: endotracheal airway      Successful airway: ETT  Cuffed: yes   Successful intubation technique: direct laryngoscopy  Facilitating devices/methods: cricoid pressure and Bougie  Endotracheal tube insertion site: oral  Blade: Velez  Blade size: 2  ETT size (mm): 7.0  Cormack-Lehane Classification: grade IIb - view of arytenoids or posterior of glottis only  Placement verified by: chest auscultation and capnometry   Cuff volume (mL): 8  Measured from: teeth  ETT/EBT  to teeth (cm): 21  Number of attempts at approach: 1  Assessment: lips, teeth, and gum same as pre-op and atraumatic intubation

## 2024-11-21 NOTE — ANESTHESIA PREPROCEDURE EVALUATION
Anesthesia Evaluation     NPO Solid Status: > 8 hours  NPO Liquid Status: > 8 hours           Airway   Mallampati: I  No difficulty expected  Dental - normal exam     Pulmonary    Cardiovascular   Exercise tolerance: good (4-7 METS)    (+) hyperlipidemia      Neuro/Psych  GI/Hepatic/Renal/Endo      Musculoskeletal     Abdominal    Substance History      OB/GYN          Other   arthritis,                 Anesthesia Plan    ASA 2     general     intravenous induction     Anesthetic plan, risks, benefits, and alternatives have been provided, discussed and informed consent has been obtained with: patient.    CODE STATUS:

## 2024-11-22 LAB
CYTO UR: NORMAL
LAB AP CASE REPORT: NORMAL
PATH REPORT.FINAL DX SPEC: NORMAL
PATH REPORT.GROSS SPEC: NORMAL

## 2024-11-23 NOTE — OP NOTE
DATE OF PROCEDURE: 11/23/24     PREOPERATIVE DIAGNOSES: Internal and external hemorrhoids.      POSTOPERATIVE DIAGNOSES: Internal and external hemorrhoids.      PROCEDURES PERFORMED: Internal and external hemorrhoidectomy x 3      SURGEON: Marlon Aguilera MD     Surgical Assistant: Elisa Miles PA-C     Estimated Blood Loss: minimal    Specimens:   Order Name Source Comment Collection Info Order Time   TISSUE PATHOLOGY EXAM Hemorrhoid(s)  Collected By: Marlon Aguilera MD 11/21/2024 12:25 PM     Release to patient   Routine Release             Specimens       ID Source Type Tests Collected By Collected At Frozen?    A Hemorrhoid(s) Tissue TISSUE PATHOLOGY EXAM   Marlon Aguilera MD 11/21/24 1224 No    Description: LEFT LATERAL HEMORRHOID    B Hemorrhoid(s) Tissue TISSUE PATHOLOGY EXAM   Marlon Aguilera MD 11/21/24 1228 No    Description: RIGHT ANTERIOR HEMORRHOID    C Hemorrhoid(s) Tissue TISSUE PATHOLOGY EXAM   Marlon Aguilera MD 11/21/24 1231 No    Description: RIGHT POSTERIOR HEMORRHOID             INDICATIONS: This is a 64 y.o. female  who comes in with enlarged hemorrhoids. she has failed conservative therapy and wishes to have them removed. she understands the risks, benefits, and alternatives, and wishes to proceed.      DESCRIPTION OF PROCEDURE: The patient was brought into the operating room, SCDs were placed, antibiotics were infused. After adequate general anesthesia was achieved, the patient was placed prone on the operating room table. Buttocks were effaced with tape, and she was prepped and draped in sterile fashion. Then 1% lidocaine with epinephrine and 0.25% Marcaine without was used as local infiltration and a perineum block. I did a circumferential exam of the anal canal using a lighted Hill-Medeiros and found enlarged internal and external hemorrhoids.     I did the left lateral, right posterior, and then the right anterior. I did them in the same fashion. I made an elliptical incision  around the internal and external hemorrhoid. Sweeping the sphincter muscle out of the way, I then used the Enseal to take the hemorrhoid at its base. I used a 2-0 Vicryl in a running fashion to closed the incision.  I then did the others in the same fashion.  I ensured good hemostasis.      All instrument, lap counts, and needle counts were correct. The patient was stable throughout the entire case and was taken to recovery.

## 2024-12-11 ENCOUNTER — OFFICE VISIT (OUTPATIENT)
Dept: SURGERY | Facility: CLINIC | Age: 64
End: 2024-12-11
Payer: COMMERCIAL

## 2024-12-11 VITALS
HEIGHT: 64 IN | SYSTOLIC BLOOD PRESSURE: 110 MMHG | BODY MASS INDEX: 32.42 KG/M2 | WEIGHT: 189.9 LBS | OXYGEN SATURATION: 99 % | DIASTOLIC BLOOD PRESSURE: 80 MMHG | HEART RATE: 71 BPM

## 2024-12-11 DIAGNOSIS — K64.8 INTERNAL HEMORRHOIDS: Primary | ICD-10-CM

## 2024-12-11 DIAGNOSIS — K64.4 EXTERNAL HEMORRHOIDS: ICD-10-CM

## 2024-12-11 PROCEDURE — 99024 POSTOP FOLLOW-UP VISIT: CPT | Performed by: PHYSICIAN ASSISTANT

## 2024-12-11 RX ORDER — LIDOCAINE 50 MG/G
1 OINTMENT TOPICAL EVERY 4 HOURS PRN
Qty: 35.44 G | Refills: 4 | Status: SHIPPED | OUTPATIENT
Start: 2024-12-11 | End: 2025-01-10

## 2024-12-11 NOTE — PROGRESS NOTES
"Cary Correa is a 64 y.o. female in for follow up of Internal hemorrhoids    External hemorrhoids    Pt is S/P internal and external hemorrhoidectomy x3 on 11/21/2024 and presents today for a follow-up.   Pt states that she experienced pain post-op that is gradually improving with time.   Was taking Percocet and now taking Tylenol with good control.  Also applying Lidocaine 5% ointment.   Was taking Miralax, but has since discontinued this.   Having more formed bowel movements without difficulty.   Spotty bleeding and mucus drainage, but also improving with time.   She overall feels that she is healing well and denies any concerns at this time.     /80 (BP Location: Right arm, Patient Position: Sitting, Cuff Size: Adult)   Pulse 71   Ht 162.6 cm (64\")   Wt 86.1 kg (189 lb 14.4 oz)   LMP  (LMP Unknown)   SpO2 99%   BMI 32.60 kg/m²   Body mass index is 32.6 kg/m².      PE:   Physical Exam  Exam conducted with a chaperone present.   Constitutional:       General: She is not in acute distress.     Appearance: She is well-developed.   HENT:      Head: Normocephalic and atraumatic.   Abdominal:      General: There is no distension.      Palpations: Abdomen is soft.   Genitourinary:     Comments: Perianal exam: Healing incisions with minimal mucus drainage. No active bleeding.   Musculoskeletal:         General: Normal range of motion.   Neurological:      Mental Status: She is alert.   Psychiatric:         Thought Content: Thought content normal.         Assessments:   1. Internal hemorrhoids    2. External hemorrhoids    - S/P internal and external hemorrhoidectomy x3 on 11/21/2024     Plan:   - Pt is healing well following surgery.   - Refills for Lidocaine 5% sent to [facility] pharmacy per Pt request. Apply PRN.   - Continue OTC Tylenol PRN for pain  - Miralax as needed for constipation  - Gradually increase physical activity as tolerated   - Follow up as needed for any new or worsening symptoms.   "

## 2024-12-13 ENCOUNTER — TELEPHONE (OUTPATIENT)
Dept: SURGERY | Facility: CLINIC | Age: 64
End: 2024-12-13
Payer: COMMERCIAL

## 2025-03-19 ENCOUNTER — OFFICE VISIT (OUTPATIENT)
Dept: INTERNAL MEDICINE | Facility: CLINIC | Age: 65
End: 2025-03-19
Payer: COMMERCIAL

## 2025-03-19 VITALS
TEMPERATURE: 97.6 F | BODY MASS INDEX: 32.61 KG/M2 | SYSTOLIC BLOOD PRESSURE: 122 MMHG | OXYGEN SATURATION: 97 % | HEART RATE: 70 BPM | DIASTOLIC BLOOD PRESSURE: 79 MMHG | WEIGHT: 191 LBS | HEIGHT: 64 IN

## 2025-03-19 DIAGNOSIS — R14.0 BLOATING SYMPTOM: Primary | ICD-10-CM

## 2025-03-19 DIAGNOSIS — N89.8 VAGINAL SCAR: ICD-10-CM

## 2025-03-19 PROCEDURE — 99213 OFFICE O/P EST LOW 20 MIN: CPT

## 2025-03-19 RX ORDER — SIMETHICONE 80 MG
80 TABLET,CHEWABLE ORAL EVERY 6 HOURS PRN
Qty: 30 TABLET | Refills: 2 | Status: SHIPPED | OUTPATIENT
Start: 2025-03-19

## 2025-03-19 RX ORDER — ESTRADIOL 1 MG/1
1 TABLET ORAL DAILY
Qty: 90 TABLET | Refills: 3 | Status: SHIPPED | OUTPATIENT
Start: 2025-03-19

## 2025-03-19 NOTE — PROGRESS NOTES
"Chief Complaint  Hyperlipidemia    Subjective        Cary Correa presents to [facility]  History of Present Illness  65-year-old female presenting with left lower quadrant discomfort, bloating and hyperlipidemia.  Had internal and external hemorrhoidectomy completed in November by Dr. Aguilera.  Is healing well from the surgery.  Has noticed that there is an extra \"skin flap\" that is bothersome.  Stool has been softer and often is unable to completely empty.  Has been staying hydrated with water which seems to help.  Taking MiraLAX or stool softeners tends to make it too soft and she has to defecate more frequently.    Experiencing some ongoing left lower quadrant discomfort.  States it is tolerable but is worse after eating or when having a bowel movement.  Denies any swelling, discoloration of skin, heat to the area, nausea or vomiting.  Is lactose intolerant and if she has some cheese, the area becomes significantly more painful.      Objective   Vital Signs:  /79 (BP Location: Left arm, Patient Position: Sitting, Cuff Size: Large Adult)   Pulse 70   Temp 97.6 °F (36.4 °C) (Temporal)   Ht 162.6 cm (64.02\")   Wt 86.6 kg (191 lb)   SpO2 97%   BMI 32.77 kg/m²   Estimated body mass index is 32.77 kg/m² as calculated from the following:    Height as of this encounter: 162.6 cm (64.02\").    Weight as of this encounter: 86.6 kg (191 lb).               Physical Exam  Vitals reviewed.   Constitutional:       Appearance: Normal appearance.   HENT:      Head: Normocephalic.   Abdominal:       Musculoskeletal:         General: Normal range of motion.      Cervical back: Normal range of motion.   Skin:     General: Skin is warm and dry.      Capillary Refill: Capillary refill takes less than 2 seconds.   Neurological:      General: No focal deficit present.      Mental Status: She is alert and oriented to person, place, and time.   Psychiatric:         Mood and Affect: Mood normal. "         Behavior: Behavior normal.         Thought Content: Thought content normal.         Judgment: Judgment normal.        Result Review :      Common labs          9/19/2024    11:03 11/6/2024    09:38   Common Labs   Glucose 96  97    BUN 15  13    Creatinine 0.80  0.86    Sodium 140  136    Potassium 4.4  3.9    Chloride 104  102    Calcium 9.8  9.3    Albumin 4.3  4.1    Total Bilirubin 0.5  0.4    Alkaline Phosphatase 75  73    AST (SGOT) 21  27    ALT (SGPT) 18  24    WBC 6.16  6.91    Hemoglobin 14.5  14.6    Hematocrit 44.6  44.7    Platelets 239  282    Total Cholesterol 214     Triglycerides 91     HDL Cholesterol 57     LDL Cholesterol  141     Hemoglobin A1C 5.90           Current Outpatient Medications on File Prior to Visit   Medication Sig Dispense Refill    acetaminophen (TYLENOL) 325 MG tablet Take 2 tablets by mouth Every 4 (Four) Hours As Needed for Mild Pain.      [DISCONTINUED] estradiol (ESTRACE) 1 MG tablet Take 1 tablet by mouth Daily. 90 tablet 1     No current facility-administered medications on file prior to visit.                Assessment and Plan     Diagnoses and all orders for this visit:    1. Bloating symptom (Primary)  -     simethicone (MYLICON) 80 MG chewable tablet; Chew 1 tablet Every 6 (Six) Hours As Needed for Flatulence.  Dispense: 30 tablet; Refill: 2    2. Vaginal scar  -     estradiol (ESTRACE) 1 MG tablet; Take 1 tablet by mouth Daily.  Dispense: 90 tablet; Refill: 3        Patient Instructions   Continue medications as prescribed.Take simethicone (Gas-X) for bloating. Recommend over-the-counter probiotic daily. Increase activity as tolerated. Stay hydrated with water.  Recommend fiber intake daily. Contact Dr. Aguilera if symptoms persist. Follow-up in 6 months for medicare wellness and fasting labs.           Follow Up     Return in about 6 months (around 9/19/2025) for Medicare Wellness.  Patient was given instructions and counseling regarding her condition or for  health maintenance advice. Please see specific information pulled into the AVS if appropriate.

## 2025-03-19 NOTE — PATIENT INSTRUCTIONS
Continue medications as prescribed.Take simethicone (Gas-X) for bloating. Recommend over-the-counter probiotic daily. Increase activity as tolerated. Stay hydrated with water.  Recommend fiber intake daily. Contact Dr. Aguilera if symptoms persist. Follow-up in 6 months for medicare wellness and fasting labs.

## 2025-05-13 ENCOUNTER — OFFICE VISIT (OUTPATIENT)
Dept: SURGERY | Facility: CLINIC | Age: 65
End: 2025-05-13
Payer: COMMERCIAL

## 2025-05-13 VITALS
BODY MASS INDEX: 32.85 KG/M2 | OXYGEN SATURATION: 98 % | SYSTOLIC BLOOD PRESSURE: 120 MMHG | WEIGHT: 192.4 LBS | HEART RATE: 61 BPM | HEIGHT: 64 IN | DIASTOLIC BLOOD PRESSURE: 74 MMHG

## 2025-05-13 DIAGNOSIS — K62.89 RECTAL PAIN: Primary | ICD-10-CM

## 2025-05-13 PROCEDURE — 46600 DIAGNOSTIC ANOSCOPY SPX: CPT | Performed by: PHYSICIAN ASSISTANT

## 2025-05-13 PROCEDURE — 99214 OFFICE O/P EST MOD 30 MIN: CPT | Performed by: PHYSICIAN ASSISTANT

## 2025-05-13 RX ORDER — SODIUM CHLORIDE, SODIUM LACTATE, POTASSIUM CHLORIDE, CALCIUM CHLORIDE 600; 310; 30; 20 MG/100ML; MG/100ML; MG/100ML; MG/100ML
30 INJECTION, SOLUTION INTRAVENOUS CONTINUOUS
OUTPATIENT
Start: 2025-05-13 | End: 2025-05-14

## 2025-05-13 RX ORDER — METRONIDAZOLE 500 MG/1
500 TABLET ORAL 3 TIMES DAILY
Qty: 21 TABLET | Refills: 0 | Status: SHIPPED | OUTPATIENT
Start: 2025-05-13 | End: 2025-05-20

## 2025-05-13 RX ORDER — CIPROFLOXACIN 750 MG/1
750 TABLET, FILM COATED ORAL 2 TIMES DAILY
Qty: 14 TABLET | Refills: 0 | Status: SHIPPED | OUTPATIENT
Start: 2025-05-13 | End: 2025-05-20

## 2025-05-13 NOTE — PROGRESS NOTES
"Cary Correa is a 65 y.o. female in for follow up of Rectal pain    History of Present Illness      Pt is S/P internal and external hemorrhoidectomy x3 on 11/21/2024. She recovered well from her procedure, but a few months ago, developed discomfort in the rectum. She describes feeling pressure and initially contributed this to her hemorrhoids becoming large again. She states that she can feel extra tissue near her anus. She has not been applying any HC cream, but rather PrepH. She is staying consistent with Miralax and having regular bowel movement. Will occasionally have softer or firmer stools depending on diet, but denies any diarrhea. She is using Sitz baths, but continues to feel discomfort.     Pt has previously mentioned LLQ pain that started in 07/2024 after lifting a heavy suitcase. She felt a bulge and pain in that area depending on the position in which she sat with pain improving when she leaned to the right. She had an U/S in 09/2024 which was negative and then a CT in 11/2024, which also was unable to find a source of her pain. Pain was believed to be MSK in nature and Pt states that she has been taking Ibuprofen without improvement. Today, Pt states that she continues to experience intermittent LLQ pain. This time she states that the pain improves after defecation. She denies any RB, mucus drainage, fevers, nausea, or vomiting.     /74 (BP Location: Left arm, Patient Position: Sitting, Cuff Size: Adult)   Pulse 61   Ht 162.6 cm (64\")   Wt 87.3 kg (192 lb 6.4 oz)   LMP  (LMP Unknown)   SpO2 98%   Breastfeeding No   BMI 33.03 kg/m²   Body mass index is 33.03 kg/m².      PE: Physical Exam    Physical Exam  Exam conducted with a chaperone present.   Constitutional:       General: She is not in acute distress.     Appearance: She is well-developed.   HENT:      Head: Normocephalic and atraumatic.   Abdominal:      General: There is no distension.      Palpations: Abdomen is soft. "   Genitourinary:     Comments: Perianal exam: Small anal skin tags. External hemorrhoids WNL. No obvious anal fissures.  JOANN- Adequate tone, no masses  Anoscopy performed: Diffuse erythema noted in the anal canal and distal rectum. Internal hemorrhoids WNL. No active bleeding.   Musculoskeletal:         General: Normal range of motion.   Neurological:      Mental Status: She is alert.   Psychiatric:         Thought Content: Thought content normal.       Assessment:   1. Rectal pain    - New    Plan:  - Pt has experienced rectal pressure for several months and was found to have diffuse erythema within the anal canal. She will be started on ABx and scheduled for a colonoscopy with Bx for further evaluation. Follow-up after colonoscopy.

## 2025-05-29 ENCOUNTER — ANESTHESIA (OUTPATIENT)
Dept: GASTROENTEROLOGY | Facility: HOSPITAL | Age: 65
End: 2025-05-29
Payer: COMMERCIAL

## 2025-05-29 ENCOUNTER — ANESTHESIA EVENT (OUTPATIENT)
Dept: GASTROENTEROLOGY | Facility: HOSPITAL | Age: 65
End: 2025-05-29
Payer: COMMERCIAL

## 2025-05-29 ENCOUNTER — HOSPITAL ENCOUNTER (OUTPATIENT)
Facility: HOSPITAL | Age: 65
Setting detail: HOSPITAL OUTPATIENT SURGERY
Discharge: HOME | End: 2025-05-29
Attending: COLON & RECTAL SURGERY | Admitting: COLON & RECTAL SURGERY
Payer: COMMERCIAL

## 2025-05-29 VITALS
TEMPERATURE: 97.9 F | RESPIRATION RATE: 13 BRPM | SYSTOLIC BLOOD PRESSURE: 116 MMHG | OXYGEN SATURATION: 97 % | WEIGHT: 190 LBS | DIASTOLIC BLOOD PRESSURE: 76 MMHG | HEIGHT: 64 IN | BODY MASS INDEX: 32.44 KG/M2 | HEART RATE: 64 BPM

## 2025-05-29 DIAGNOSIS — K62.89 RECTAL PAIN: ICD-10-CM

## 2025-05-29 PROCEDURE — 25010000002 PROPOFOL 1000 MG/100ML EMULSION: Performed by: NURSE ANESTHETIST, CERTIFIED REGISTERED

## 2025-05-29 PROCEDURE — 25010000002 PROPOFOL 200 MG/20ML EMULSION: Performed by: NURSE ANESTHETIST, CERTIFIED REGISTERED

## 2025-05-29 PROCEDURE — 25010000002 LIDOCAINE 2% SOLUTION: Performed by: NURSE ANESTHETIST, CERTIFIED REGISTERED

## 2025-05-29 PROCEDURE — 25810000003 LACTATED RINGERS PER 1000 ML: Performed by: PHYSICIAN ASSISTANT

## 2025-05-29 PROCEDURE — 45378 DIAGNOSTIC COLONOSCOPY: CPT | Performed by: COLON & RECTAL SURGERY

## 2025-05-29 RX ORDER — HYOSCYAMINE SULFATE 0.12 MG/1
125 TABLET ORAL EVERY 4 HOURS PRN
Qty: 120 TABLET | Refills: 1 | Status: SHIPPED | OUTPATIENT
Start: 2025-05-29 | End: 2026-05-29

## 2025-05-29 RX ORDER — LIDOCAINE HYDROCHLORIDE 20 MG/ML
INJECTION, SOLUTION INFILTRATION; PERINEURAL AS NEEDED
Status: DISCONTINUED | OUTPATIENT
Start: 2025-05-29 | End: 2025-05-29 | Stop reason: SURG

## 2025-05-29 RX ORDER — PROPOFOL 10 MG/ML
INJECTION, EMULSION INTRAVENOUS CONTINUOUS PRN
Status: DISCONTINUED | OUTPATIENT
Start: 2025-05-29 | End: 2025-05-29 | Stop reason: SURG

## 2025-05-29 RX ORDER — PROPOFOL 10 MG/ML
INJECTION, EMULSION INTRAVENOUS AS NEEDED
Status: DISCONTINUED | OUTPATIENT
Start: 2025-05-29 | End: 2025-05-29 | Stop reason: SURG

## 2025-05-29 RX ORDER — SODIUM CHLORIDE, SODIUM LACTATE, POTASSIUM CHLORIDE, CALCIUM CHLORIDE 600; 310; 30; 20 MG/100ML; MG/100ML; MG/100ML; MG/100ML
30 INJECTION, SOLUTION INTRAVENOUS CONTINUOUS
Status: DISCONTINUED | OUTPATIENT
Start: 2025-05-29 | End: 2025-05-29 | Stop reason: HOSPADM

## 2025-05-29 RX ADMIN — PROPOFOL INJECTABLE EMULSION 100 MG: 10 INJECTION, EMULSION INTRAVENOUS at 13:14

## 2025-05-29 RX ADMIN — SODIUM CHLORIDE, SODIUM LACTATE, POTASSIUM CHLORIDE, AND CALCIUM CHLORIDE 30 ML/HR: .6; .31; .03; .02 INJECTION, SOLUTION INTRAVENOUS at 12:38

## 2025-05-29 RX ADMIN — PROPOFOL 160 MCG/KG/MIN: 10 INJECTION, EMULSION INTRAVENOUS at 13:14

## 2025-05-29 RX ADMIN — LIDOCAINE HYDROCHLORIDE 60 MG: 20 INJECTION, SOLUTION INFILTRATION; PERINEURAL at 13:14

## 2025-05-29 NOTE — ANESTHESIA POSTPROCEDURE EVALUATION
Patient: Cary Correa    Procedure Summary       Date: 05/29/25 Room / Location: [facility] ENDOSCOPY 5 / [facility] ENDOSCOPY    Anesthesia Start: 1308 Anesthesia Stop: 1331    Procedure: COLONOSCOPY to CECUM Diagnosis:       Rectal pain      (Rectal pain [K62.89])    Surgeons: Marlon Aguilera MD Provider: Ang Coronado MD    Anesthesia Type: MAC ASA Status: 2            Anesthesia Type: MAC    Vitals  Vitals Value Taken Time   /76 05/29/25 13:54   Temp     Pulse 60 05/29/25 13:58   Resp 13 05/29/25 13:44   SpO2 97 % 05/29/25 13:58   Vitals shown include unfiled device data.        Post Anesthesia Care and Evaluation    Patient location during evaluation: PACU  Patient participation: complete - patient participated  Level of consciousness: awake and alert  Pain management: adequate    Airway patency: patent  Anesthetic complications: No anesthetic complications    Cardiovascular status: acceptable  Respiratory status: acceptable  Hydration status: acceptable    Comments: --------------------            05/29/25               1354     --------------------   BP:       116/76     Pulse:      64       Resp:                Temp:                SpO2:      97%      --------------------

## 2025-05-29 NOTE — H&P
Cary Correa is a 65 y.o. female  who is referred by Marlon Aguilera MD for a colonoscopy. She   has an indications: rectal pain.     She denies any change in bowel function, melena, or hematochezia.    Past Medical History:   Diagnosis Date    Allergic     Penicillin    Cystocele     Enterocele     Hypercholesterolemia     PONV (postoperative nausea and vomiting)     Prolapse of uterus     Rectal bleeding     After surgery    Rectocele     Skin lesion     MONS PUBIS       Past Surgical History:   Procedure Laterality Date    ANTERIOR AND POSTERIOR VAGINAL REPAIR N/A 08/24/2016    Procedure: ANTERIOR AND POSTERIOR  REPAIR;  Surgeon: Dilip Bond MD;  Location:  [facility];  Service:     ANTERIOR AND POSTERIOR VAGINAL REPAIR N/A 08/10/2017    Procedure:  ANTERIOR & POSTERIOR COLPORRHAPHY, ABDOMINAL ENTERCELE REPAIR, CYSTOURETHROSCOPY, RELEASE VAGINAL SCAR BAND, RECONSTRUCT PERINEAL BODY, REMOVE LESION ON MONSPUBIS;  Surgeon: Sonia Alvarez MD;  Location: [facility] MAIN OR;  Service:     BREAST CYST ASPIRATION  2021    HEMORRHOIDECTOMY N/A 11/21/2024    Procedure: HEMORRHOIDECTOMY X 3;  Surgeon: Marlon Aguilera MD;  Location: [facility] OR;  Service: General;  Laterality: N/A;    LAPAROSCOPIC ASSISTED VAGINAL HYSTERECTOMY N/A 08/24/2016    Procedure: LAVH AND BSO;  Surgeon: Dilip Bond MD;  [facility];  Service:     PLANTAR FASCIECTOMY Left 12/08/2009    Dr. Brandyn Swan    SACROCOLPOPEXY N/A 08/10/2017    Procedure: SACRAL COLPOPEXY, SACROSPINOUS LIGAMENT FIXATION, EXTRAPERITONEAL COLPOPEXY,;  Surgeon: Sonia Alvarez MD;  Location: [facility] OR;  Service:     US GUIDED CYST ASPIRATION BREAST N/A 07/30/2021    UTEROSACRAL SUSPENSION LAPAROSCOPIC N/A 08/10/2017    Procedure: LAPAROSCOPIC UTEROSACRAL LIGMENT SUSPENSION, ;  Surgeon: Sonia Alvarez MD;  Location: [facility] OR;  Service:        Medications Prior to Admission   Medication Sig Dispense Refill Last Dose/Taking     acetaminophen (TYLENOL) 325 MG tablet Take 2 tablets by mouth Every 4 (Four) Hours As Needed for Mild Pain.   Past Week    estradiol (ESTRACE) 1 MG tablet Take 1 tablet by mouth Daily. 90 tablet 3 5/28/2025 Morning    simethicone (MYLICON) 80 MG chewable tablet Chew 1 tablet Every 6 (Six) Hours As Needed for Flatulence. 30 tablet 2 More than a month       Allergies   Allergen Reactions    Lactose Intolerance (Gi) GI Intolerance    Penicillins Hives     Beta lactam allergy details  Antibiotic reaction: hives  Age at reaction: child  Dose to reaction time: (!) hours  Reason for antibiotic: unknown  Epinephrine required for reaction?: no  Tolerated antibiotics: unknown          Family History   Problem Relation Age of Onset    Hypertension Mother     Hyperlipidemia Mother     Diabetes Father     Hyperlipidemia Father     Malig Hyperthermia Neg Hx     Breast cancer Neg Hx        Social History     Socioeconomic History    Marital status:    Tobacco Use    Smoking status: Never     Passive exposure: Never    Smokeless tobacco: Never   Vaping Use    Vaping status: Never Used   Substance and Sexual Activity    Alcohol use: No    Drug use: No    Sexual activity: Not Currently     Partners: Male     Birth control/protection: Post-menopausal, Hysterectomy       ROS    Vitals:    05/29/25 1234   BP: 126/68   Pulse: 64   Resp: 16   Temp: 97.9 °F (36.6 °C)   SpO2: 97%         Physical Exam  Constitutional:       Appearance: She is well-developed.   HENT:      Head: Normocephalic and atraumatic.   Eyes:      Pupils: Pupils are equal, round, and reactive to light.   Cardiovascular:      Rate and Rhythm: Regular rhythm.   Pulmonary:      Effort: Pulmonary effort is normal.   Abdominal:      General: There is no distension.      Palpations: Abdomen is soft.   Musculoskeletal:         General: Normal range of motion.   Skin:     General: Skin is warm and dry.   Neurological:      Mental Status: She is alert and oriented to  person, place, and time.   Psychiatric:         Thought Content: Thought content normal.         Judgment: Judgment normal.           Assessment & Plan      indications: rectal pain         I recommend colonoscopy.  I described risks, benefits of the procedure with the patient including but not limited to bleeding, infection, possibility of perforation and possible polypectomy. All of the patient's questions were answered and they would like to proceed with the above recommendations.

## 2025-05-29 NOTE — DISCHARGE INSTRUCTIONS
For the next 24 hours patient needs to be with a responsible adult.    For THE REST OF TODAY DO NOT drive, operate machinery, appliances, drink alcohol, make important decisions or sign legal documents.    Start with a light or bland diet if you are feeling sick to your stomach otherwise advance to regular diet as tolerated.    Follow recommendations on procedure report if provided by your doctor.    Call Dr Aguilera for problems [phone]     Problems may include but not limited to: large amounts of bleeding, trouble breathing, repeated vomiting, severe unrelieved pain, fever or chills.      If biopsies or polyps were taken, MD will call you with the results in about 7 days. If you don't hear from the MD in 2 weeks, call the number above.

## 2025-05-29 NOTE — ANESTHESIA PREPROCEDURE EVALUATION
Anesthesia Evaluation     Patient summary reviewed and Nursing notes reviewed   history of anesthetic complications:  PONV               Airway   Mallampati: II  TM distance: >3 FB  Neck ROM: full  Dental      Pulmonary - negative pulmonary ROS   Cardiovascular     ECG reviewed  Rhythm: regular  Rate: normal    (+) hyperlipidemia      Neuro/Psych- negative ROS  GI/Hepatic/Renal/Endo    (+) obesity, GI bleeding     Musculoskeletal (-) negative ROS    Abdominal    Substance History - negative use     OB/GYN negative ob/gyn ROS         Other                    Anesthesia Plan    ASA 2     MAC     intravenous induction     Anesthetic plan, risks, benefits, and alternatives have been provided, discussed and informed consent has been obtained with: patient.    CODE STATUS:

## 2025-07-11 ENCOUNTER — OFFICE VISIT (OUTPATIENT)
Dept: SURGERY | Facility: CLINIC | Age: 65
End: 2025-07-11
Payer: COMMERCIAL

## 2025-07-11 VITALS
WEIGHT: 190 LBS | HEIGHT: 64 IN | OXYGEN SATURATION: 97 % | BODY MASS INDEX: 32.44 KG/M2 | DIASTOLIC BLOOD PRESSURE: 90 MMHG | SYSTOLIC BLOOD PRESSURE: 140 MMHG | HEART RATE: 66 BPM

## 2025-07-11 DIAGNOSIS — K64.4 ANAL SKIN TAG: ICD-10-CM

## 2025-07-11 DIAGNOSIS — R10.32 ABDOMINAL PAIN, LLQ: Primary | ICD-10-CM

## 2025-07-11 RX ORDER — HYDROCORTISONE 25 MG/G
CREAM TOPICAL
Qty: 30 G | Refills: 1 | Status: SHIPPED | OUTPATIENT
Start: 2025-07-11

## 2025-07-11 NOTE — PROGRESS NOTES
"Cary Correa is a 65 y.o. female in for follow up of Abdominal pain, LLQ    History of Present Illness  The patient is a 65-year-old female who presents for follow-up after a colonoscopy performed in May 2025. She was seen last fall for abdominal pain and enlarged hemorrhoids, for which she had a hemorrhoidectomy in November 2024.    She continues to experience abdominal discomfort, which intensifies after eating and subsides slightly after bowel movements. Her bowel movements have been regular recently, aided by hydration and occasional use of Dulcolax. She has tried fiber supplements but found them to soften her stools excessively, necessitating frequent wiping. She also uses stool softeners as needed, although not recently. Probiotics have not made a noticeable difference. The onset of her current symptoms coincided with lifting heavy luggage during a vacation last year. An ultrasound and CT scan were performed, revealing no hernia. She has been taking hyoscyamine intermittently for pain management.    She reports that her external hemorrhoids are causing discomfort. They occasionally become inflamed and irritated, leading to minor spotting.    MEDICATIONS  Current: Dulcolax, hyoscyamine       /90 (BP Location: Left arm, Patient Position: Sitting, Cuff Size: Adult)   Pulse 66   Ht 162.6 cm (64\")   Wt 86.2 kg (190 lb)   LMP  (LMP Unknown)   SpO2 97%   BMI 32.61 kg/m²   Body mass index is 32.61 kg/m².      PE:   Physical Exam    Physical Exam  Constitutional:       General: She is not in acute distress.     Appearance: She is well-developed.   HENT:      Head: Normocephalic and atraumatic.   Abdominal:      General: There is no distension.      Palpations: Abdomen is soft.   Genitourinary:     Comments: Perianal exam reveals scar tissue on the right anterior and right lateral. There is a minor thickening on the right lateral, but it is very minor hemorrhoid. Digital rectal exam shows no masses " felt.  Musculoskeletal:         General: Normal range of motion.   Neurological:      Mental Status: She is alert.   Psychiatric:         Thought Content: Thought content normal.             Assessment & Plan  1. Abdominal pain.  The abdominal pain is likely due to irritable bowel syndrome, as no internal abnormalities were detected during the colonoscopy. The presence of diverticulosis without infection or diverticulitis was noted. The pain could be a result of colon movement, which is not an anatomical issue but rather a functional one. A CT scan conducted in November 2024 revealed no hernia in the abdominal wall, but a few cysts on the kidneys were observed. However, these cysts are not typically associated with pain. All other internal organs appeared normal. Magnesium 400 mg at night was recommended to help with cramping and regularity. Hyoscyamine (Levsin) was prescribed, to be taken up to four times daily for abdominal pain.    2. Hemorrhoids.  The hemorrhoids are causing discomfort and occasional spotting due to irritation from wiping. A higher dose of hemorrhoid cream than Preparation H was prescribed, to be applied internally and externally for 5 to 7 days to alleviate inflammation and irritation.    Follow-up  A follow-up appointment is scheduled for 6 weeks from now.     1. Abdominal pain, LLQ           Patient or patient representative verbalized consent for the use of Ambient Listening during the visit with  Marlon Aguilera MD for chart documentation. 7/17/2025  14:18 EDT

## 2025-08-04 ENCOUNTER — TRANSCRIBE ORDERS (OUTPATIENT)
Dept: ADMINISTRATIVE | Facility: HOSPITAL | Age: 65
End: 2025-08-04
Payer: COMMERCIAL

## 2025-08-04 DIAGNOSIS — Z12.31 SCREENING MAMMOGRAM, ENCOUNTER FOR: Primary | ICD-10-CM

## (undated) DEVICE — VICRYL VLT 0 45CM ENDOLOOP: Brand: ENDOLOOP

## (undated) DEVICE — SPONGE,LAP,18"X18",DLX,XR,ST,5/PK,40/PK: Brand: MEDLINE

## (undated) DEVICE — PATIENT RETURN ELECTRODE, SINGLE-USE, CONTACT QUALITY MONITORING, ADULT, WITH 9FT CORD, FOR PATIENTS WEIGING OVER 33LBS. (15KG): Brand: MEGADYNE

## (undated) DEVICE — KT ORCA ORCAPOD DISP STRL

## (undated) DEVICE — MEDI-VAC YANKAUER SUCTION HANDLE W/BULBOUS TIP: Brand: CARDINAL HEALTH

## (undated) DEVICE — LOU MINOR PROCEDURE: Brand: MEDLINE INDUSTRIES, INC.

## (undated) DEVICE — ENDOPATH XCEL BLADELESS TROCARS WITH STABILITY SLEEVES: Brand: ENDOPATH XCEL

## (undated) DEVICE — SKIN PREP TRAY W/CHG: Brand: MEDLINE INDUSTRIES, INC.

## (undated) DEVICE — CONTN STRL 32OZ

## (undated) DEVICE — VAGINAL PACKING: Brand: DEROYAL

## (undated) DEVICE — TROCAR SITE CLOSURE DEVICE: Brand: ENDO CLOSE

## (undated) DEVICE — SINGLE BASIN: Brand: CARDINAL HEALTH

## (undated) DEVICE — GOWN,SIRUS,NON REINFRCD,LARGE,SET IN SL: Brand: MEDLINE

## (undated) DEVICE — SUT VIC 2/0 CT2 27IN J269H

## (undated) DEVICE — INTENDED FOR TISSUE SEPARATION, AND OTHER PROCEDURES THAT REQUIRE A SHARP SURGICAL BLADE TO PUNCTURE OR CUT.: Brand: BARD-PARKER ® CARBON RIB-BACK BLADES

## (undated) DEVICE — SPNG GZ WOVN 4X4IN 12PLY 10/BX STRL

## (undated) DEVICE — PAD SANI MAXI W/ADHS SNG WRP 11IN

## (undated) DEVICE — SHEARS WITH ROTICULATOR TECHNOLOGY: Brand: ENDO MINI-SHEARS

## (undated) DEVICE — LN SMPL CO2 SHTRM SD STREAM W/M LUER

## (undated) DEVICE — PREP SOL POVIDONE/IODINE BT 4OZ

## (undated) DEVICE — DRAPE,UNDERBUTTOCKS,PCH,STERILE: Brand: MEDLINE

## (undated) DEVICE — SOL NACL 0.9PCT 1000ML

## (undated) DEVICE — IRRIGATOR BULB ASEPTO 60CC STRL

## (undated) DEVICE — RETR STAY HK ELAS SHRP 5MM PK/8

## (undated) DEVICE — LAPAROSCOPIC GAS CONDITIONING DEVICE.: Brand: INSUFLOW

## (undated) DEVICE — SUT VIC 0 CT1 36IN J946H

## (undated) DEVICE — DRAPE,SPLIT,CV,CLR ANES,STERILE: Brand: MEDLINE

## (undated) DEVICE — GLV SURG BIOGEL LTX PF 6 1/2

## (undated) DEVICE — ST IRR CYSTO W/SPK 77IN LF

## (undated) DEVICE — SUT VIC 2/0 SH 27IN

## (undated) DEVICE — SUT VIC 0 CT 36IN J958H

## (undated) DEVICE — STERILE LATEX POWDER-FREE SURGICAL GLOVESWITH NITRILE COATING: Brand: PROTEXIS

## (undated) DEVICE — SYR CONTRL LUERLOK 10CC

## (undated) DEVICE — SPNG LAP 18X18IN LF STRL PK/5

## (undated) DEVICE — SMOKE EVACUATION TUBING WITH 8 IN INTEGRAL WAND AND SPONGE GUARD: Brand: BUFFALO FILTER

## (undated) DEVICE — ADHS SKIN DERMABOND TOP ADVANCED

## (undated) DEVICE — ENDOPATH PNEUMONEEDLE INSUFFLATION NEEDLES WITH LUER LOCK CONNECTORS 120MM: Brand: ENDOPATH

## (undated) DEVICE — DRAPE,UTILITY,TAPE,15X26,STERILE: Brand: MEDLINE

## (undated) DEVICE — PENCL E/S HNDSWCH ROCKR CB

## (undated) DEVICE — TUBING, SUCTION, 1/4" X 20', STRAIGHT: Brand: MEDLINE INDUSTRIES, INC.

## (undated) DEVICE — GLV SURG SENSICARE PI LF PF 7.5 GRN STRL

## (undated) DEVICE — COVER,MAYO STAND,STERILE: Brand: MEDLINE

## (undated) DEVICE — ADAPT CLN BIOGUARD AIR/H2O DISP

## (undated) DEVICE — PENCL ES MEGADINE EZ/CLEAN BUTN W/HOLSTR 10FT

## (undated) DEVICE — ENDOPATH XCEL UNIVERSAL TROCAR STABLILITY SLEEVES: Brand: ENDOPATH XCEL

## (undated) DEVICE — LOU GYN LAPAROSCOPY: Brand: MEDLINE INDUSTRIES, INC.

## (undated) DEVICE — 2, DISPOSABLE SUCTION/IRRIGATOR WITH DISPOSABLE TIP: Brand: STRYKEFLOW

## (undated) DEVICE — SENSR O2 OXIMAX FNGR A/ 18IN NONSTR

## (undated) DEVICE — NDL HYPO PRECISIONGLIDE REG 25G 1 1/2

## (undated) DEVICE — CANN O2 ETCO2 FITS ALL CONN CO2 SMPL A/ 7IN DISP LF

## (undated) DEVICE — TUBING, SUCTION, 1/4" X 10', STRAIGHT: Brand: MEDLINE

## (undated) DEVICE — ANTIBACTERIAL UNDYED BRAIDED (POLYGLACTIN 910), SYNTHETIC ABSORBABLE SUTURE: Brand: COATED VICRYL

## (undated) DEVICE — CURITY FLEXIBLE ADHESIVE BANDAGE: Brand: CURITY

## (undated) DEVICE — RETR RNG GEN2 14.1CMX14.1CM

## (undated) DEVICE — TOTAL TRAY, 16FR 10ML SIL FOLEY, URN: Brand: MEDLINE

## (undated) DEVICE — SYR LL TP 10ML STRL

## (undated) DEVICE — DISPOSABLE MONOPOLAR ENDOSCOPIC CORD 10 FT. (3M): Brand: KIRWAN

## (undated) DEVICE — GOWN SURG AERO CHROME XL

## (undated) DEVICE — PANTY KNIT MATERN L/XL